# Patient Record
Sex: MALE | Race: WHITE | NOT HISPANIC OR LATINO | Employment: FULL TIME | URBAN - METROPOLITAN AREA
[De-identification: names, ages, dates, MRNs, and addresses within clinical notes are randomized per-mention and may not be internally consistent; named-entity substitution may affect disease eponyms.]

---

## 2023-05-06 LAB — HEPATITIS B VIRUS SURFACE AB (MIU/ML) IN SERUM: <3.1 MIU/ML

## 2023-05-09 LAB
NIL(NEG) CONTROL SPOT COUNT: NORMAL
PANEL A SPOT COUNT: 0
PANEL B SPOT COUNT: 0
POS CONTROL SPOT COUNT: NORMAL
T-SPOT. TB INTERPRETATION: NEGATIVE

## 2023-10-31 ENCOUNTER — TELEPHONE (OUTPATIENT)
Dept: PRIMARY CARE | Facility: CLINIC | Age: 58
End: 2023-10-31
Payer: COMMERCIAL

## 2023-10-31 DIAGNOSIS — E13.9 DIABETES MELLITUS OF OTHER TYPE WITHOUT COMPLICATION, UNSPECIFIED WHETHER LONG TERM INSULIN USE (MULTI): ICD-10-CM

## 2024-01-06 ENCOUNTER — LAB (OUTPATIENT)
Dept: LAB | Facility: LAB | Age: 59
End: 2024-01-06
Payer: COMMERCIAL

## 2024-01-06 DIAGNOSIS — E13.9 DIABETES MELLITUS OF OTHER TYPE WITHOUT COMPLICATION, UNSPECIFIED WHETHER LONG TERM INSULIN USE (MULTI): ICD-10-CM

## 2024-01-06 LAB
ALBUMIN SERPL-MCNC: 4.3 G/DL (ref 3.5–5)
ALP BLD-CCNC: 79 U/L (ref 35–125)
ALT SERPL-CCNC: 11 U/L (ref 5–40)
ANION GAP SERPL CALC-SCNC: 10 MMOL/L
AST SERPL-CCNC: 23 U/L (ref 5–40)
BILIRUB SERPL-MCNC: 0.6 MG/DL (ref 0.1–1.2)
BUN SERPL-MCNC: 15 MG/DL (ref 8–25)
CALCIUM SERPL-MCNC: 9.2 MG/DL (ref 8.5–10.4)
CHLORIDE SERPL-SCNC: 102 MMOL/L (ref 97–107)
CO2 SERPL-SCNC: 28 MMOL/L (ref 24–31)
CREAT SERPL-MCNC: 0.8 MG/DL (ref 0.4–1.6)
CREAT UR-MCNC: 59.2 MG/DL
EST. AVERAGE GLUCOSE BLD GHB EST-MCNC: 143 MG/DL
GFR SERPL CREATININE-BSD FRML MDRD: >90 ML/MIN/1.73M*2
GLUCOSE SERPL-MCNC: 125 MG/DL (ref 65–99)
HBA1C MFR BLD: 6.6 %
MICROALBUMIN UR-MCNC: <12 MG/L (ref 0–23)
MICROALBUMIN/CREAT UR: NORMAL MG/G{CREAT}
POTASSIUM SERPL-SCNC: 4.4 MMOL/L (ref 3.4–5.1)
PROT SERPL-MCNC: 7 G/DL (ref 5.9–7.9)
SODIUM SERPL-SCNC: 140 MMOL/L (ref 133–145)

## 2024-01-06 PROCEDURE — 82043 UR ALBUMIN QUANTITATIVE: CPT

## 2024-01-06 PROCEDURE — 83036 HEMOGLOBIN GLYCOSYLATED A1C: CPT

## 2024-01-06 PROCEDURE — 36415 COLL VENOUS BLD VENIPUNCTURE: CPT

## 2024-01-06 PROCEDURE — 80053 COMPREHEN METABOLIC PANEL: CPT

## 2024-01-06 PROCEDURE — 82570 ASSAY OF URINE CREATININE: CPT

## 2024-01-15 ENCOUNTER — TELEPHONE (OUTPATIENT)
Dept: PRIMARY CARE | Facility: CLINIC | Age: 59
End: 2024-01-15

## 2024-01-15 ENCOUNTER — OFFICE VISIT (OUTPATIENT)
Dept: PRIMARY CARE | Facility: CLINIC | Age: 59
End: 2024-01-15
Payer: COMMERCIAL

## 2024-01-15 VITALS
DIASTOLIC BLOOD PRESSURE: 72 MMHG | BODY MASS INDEX: 28.45 KG/M2 | WEIGHT: 204 LBS | OXYGEN SATURATION: 99 % | HEART RATE: 62 BPM | SYSTOLIC BLOOD PRESSURE: 126 MMHG

## 2024-01-15 DIAGNOSIS — E11.9 TYPE 2 DIABETES MELLITUS WITHOUT COMPLICATION, WITHOUT LONG-TERM CURRENT USE OF INSULIN (MULTI): ICD-10-CM

## 2024-01-15 DIAGNOSIS — Z23 NEED FOR SHINGLES VACCINE: ICD-10-CM

## 2024-01-15 DIAGNOSIS — B35.1 TOENAIL FUNGUS: ICD-10-CM

## 2024-01-15 DIAGNOSIS — M17.12 PRIMARY OSTEOARTHRITIS OF LEFT KNEE: ICD-10-CM

## 2024-01-15 DIAGNOSIS — M23.92 INTERNAL DERANGEMENT OF LEFT KNEE: Primary | ICD-10-CM

## 2024-01-15 PROCEDURE — 90471 IMMUNIZATION ADMIN: CPT | Performed by: NURSE PRACTITIONER

## 2024-01-15 PROCEDURE — 1036F TOBACCO NON-USER: CPT | Performed by: NURSE PRACTITIONER

## 2024-01-15 PROCEDURE — 3044F HG A1C LEVEL LT 7.0%: CPT | Performed by: NURSE PRACTITIONER

## 2024-01-15 PROCEDURE — 90750 HZV VACC RECOMBINANT IM: CPT | Performed by: NURSE PRACTITIONER

## 2024-01-15 PROCEDURE — 99214 OFFICE O/P EST MOD 30 MIN: CPT | Performed by: NURSE PRACTITIONER

## 2024-01-15 PROCEDURE — 3062F POS MACROALBUMINURIA REV: CPT | Performed by: NURSE PRACTITIONER

## 2024-01-15 PROCEDURE — 3078F DIAST BP <80 MM HG: CPT | Performed by: NURSE PRACTITIONER

## 2024-01-15 PROCEDURE — 4010F ACE/ARB THERAPY RXD/TAKEN: CPT | Performed by: NURSE PRACTITIONER

## 2024-01-15 PROCEDURE — 3074F SYST BP LT 130 MM HG: CPT | Performed by: NURSE PRACTITIONER

## 2024-01-15 RX ORDER — METFORMIN HYDROCHLORIDE 850 MG/1
850 TABLET ORAL
COMMUNITY
Start: 2022-09-27

## 2024-01-15 RX ORDER — LISINOPRIL 20 MG/1
20 TABLET ORAL DAILY
COMMUNITY
Start: 2023-02-22

## 2024-01-15 RX ORDER — OMEPRAZOLE 20 MG/1
20 CAPSULE, DELAYED RELEASE ORAL
COMMUNITY

## 2024-01-15 RX ORDER — AMLODIPINE BESYLATE 10 MG/1
10 TABLET ORAL DAILY
COMMUNITY
Start: 2023-09-14

## 2024-01-15 RX ORDER — GLIPIZIDE 10 MG/1
10 TABLET ORAL 2 TIMES DAILY
COMMUNITY
Start: 2014-04-14

## 2024-01-15 RX ORDER — ASPIRIN 81 MG/1
81 TABLET ORAL DAILY
COMMUNITY
Start: 2014-04-14

## 2024-01-15 RX ORDER — SIMVASTATIN 40 MG/1
80 TABLET, FILM COATED ORAL NIGHTLY
COMMUNITY
Start: 2022-10-04

## 2024-01-15 RX ORDER — ATENOLOL 50 MG/1
50 TABLET ORAL DAILY
COMMUNITY
Start: 2014-04-14

## 2024-01-15 NOTE — PATIENT INSTRUCTIONS
Thank you for choosing our office for your healthcare needs    Please continue your current medications  Work on healthy diet including [employer] and limiting sugar/bread/carbs.    Obtain MRI of your left knee due to ongoing left knee pain  You are referred to orthopedics for evaluation of your knee.    Topical medication for toenail fungus was provided.  Please use as directed    Shingles vaccine #1 provided today in office  Plan to obtain the second shot of the series in 2 to 6 months.

## 2024-01-15 NOTE — PROGRESS NOTES
Subjective   Patient ID: Robbie Gomez is a 58 y.o. male who presents for Diabetes.    HPI   Pt is a 57 yo M who presents for interval visit  Diet is varied  Active - walks    Tolerating meds  Check glucose very sparinging  Eye exam 11/23    Hx of knee pain - acute on chronic. Was doing jumping jacks summer 2023 and felt worse pain. Worse with kneeling. Pain impacts sleep, impacts day to day function. Knee feels unstable at times  Xray 7/23 shows OA  Pt does home exercise/stretching  Has used heat, topicals  No otc used   Hx of knee surgery in his 20s      Review of Systems  Constitutional Symptoms: Negative for fever, loss of appetite, headaches, fatigue.   Cardiovascular: Negative for chest pain/pressure, palpitations, edema  Respiratory: Negative for shortness of breath, dyspnea on exertion, pain with breathing, coughing.   Gastrointestinal: Negative for nausea, vomiting, abdominal pain, change in bowel habits  Musculoskeletal: Negative for  joint swelling, myalgias, cramps.   Integumentary: Negative for skin trouble or rash. Positive for toenail fungus  Neurological: Negative for headache, numbness, tingling, weakness, tremors. Positive for knee pain-see HPI  Psychiatric: Negative for depression, anxiety.   Endocrine: Negative for weight gain, heat or cold intolerance, polyuria, polydipsia, polyphagia.   Hematologic/Lymphatic: Negative for bruising, abnormal bleeding, swollen glands    Objective   /72   Pulse 62   Wt 92.5 kg (204 lb)   SpO2 99%   BMI 28.45 kg/m²     Physical Exam  General Appearance: Vital Signs have been reviewed. ROBBIE is comfortable. He is well nourished, and well developed. He is awake, alert, and oriented and appears his stated age. The patient is cooperative with exam.   Neck: Neck reveals supple, no adenopathy, no thyromegaly, or carotid bruits.   Chest: Lungs are clear to auscultation bilaterally with no wheezes, rales, or rhonchi.   Cardiovascular: RRR without MRG.  Bilateral DP pulses are 2+. Extremities reveal no cyanosis, clubbing, or edema.   Abdomen: Abdomen is soft, NT, ND with no masses.   Musculoskeletal: 5/5 and equal strength in bilateral upper and lower extremities. Left knee without deformity.  Full range of motion.  There is crepitus.  Tenderness over medial joint line.  Negative Apley's.  Negative varus/valgus instability.  MSP intact to left foot.  3+ pedal pulse noted  Skin: Skin reveals good turgor and no rashes . foot exam Reveals mild athlete's foot between toes.  Toenail fungus to left great toe  Neurological: Neuro: Intact and non-focal. Cranial nerves II - XII are grossly intact. Motor exams reveals 5/5 strength. Sensation is normal to touch, position and vibration. DTR: normal bilaterally.   Psychiatric: Patient has appropriate judgement. Patient has good insight. Patient's mood is appropriate       Assessment/Plan   Diagnoses and all orders for this visit:  Internal derangement of left knee  -     MR knee left wo IV contrast; Future  -     Referral to Orthopaedic Surgery; Future  Needs further evaluation  MRI ordered to rule out internal derangement.  Concern for meniscus tear  Joint line tenderness and crepitus which were not present on previous exam with primary  Ice, heat, knee brace/sleeve  Topicals and anti-inflammatories  Referred to orthopedics for further evaluation  Follow-up after MRI  Primary osteoarthritis of left knee  -     MR knee left wo IV contrast; Future  -     Referral to Orthopaedic Surgery; Future  MRI ordered  Concern for meniscus tear.  Joint line tenderness and Crepitus on range of motion which was not present at last exam with pain was primary care provider on chart review  Type 2 diabetes mellitus without complication, without long-term current use of insulin (CMS/[facility])  Continue current medications  Healthy diet  ADA diet, carb counting  Exercise  Weight loss  Recheck in 6 Months  Toenail fungus  -     efinaconazole 10 %  solution with applicator; Apply 1 Application topically once daily.  Medication as prescribed  Need for shingles vaccine  Indications, benefits and risks/SE discussed  Other orders  -     Follow Up In Primary Care - Health Maintenance; Future  -     Zoster vaccine, recombinant, adult (SHINGRIX)

## 2024-01-17 ENCOUNTER — DOCUMENTATION (OUTPATIENT)
Dept: PRIMARY CARE | Facility: CLINIC | Age: 59
End: 2024-01-17
Payer: COMMERCIAL

## 2024-01-18 ENCOUNTER — TELEPHONE (OUTPATIENT)
Dept: PRIMARY CARE | Facility: CLINIC | Age: 59
End: 2024-01-18
Payer: COMMERCIAL

## 2024-01-30 ENCOUNTER — HOSPITAL ENCOUNTER (OUTPATIENT)
Dept: RADIOLOGY | Facility: HOSPITAL | Age: 59
Discharge: HOME | End: 2024-01-30
Payer: COMMERCIAL

## 2024-01-30 DIAGNOSIS — M23.92 INTERNAL DERANGEMENT OF LEFT KNEE: ICD-10-CM

## 2024-01-30 DIAGNOSIS — M17.12 PRIMARY OSTEOARTHRITIS OF LEFT KNEE: ICD-10-CM

## 2024-01-30 PROCEDURE — 73721 MRI JNT OF LWR EXTRE W/O DYE: CPT | Mod: LT

## 2024-02-12 ENCOUNTER — OFFICE VISIT (OUTPATIENT)
Dept: ORTHOPEDIC SURGERY | Facility: CLINIC | Age: 59
End: 2024-02-12
Payer: COMMERCIAL

## 2024-02-12 DIAGNOSIS — M17.12 PRIMARY OSTEOARTHRITIS OF LEFT KNEE: Primary | ICD-10-CM

## 2024-02-12 PROCEDURE — 1036F TOBACCO NON-USER: CPT | Performed by: STUDENT IN AN ORGANIZED HEALTH CARE EDUCATION/TRAINING PROGRAM

## 2024-02-12 PROCEDURE — 99203 OFFICE O/P NEW LOW 30 MIN: CPT | Performed by: STUDENT IN AN ORGANIZED HEALTH CARE EDUCATION/TRAINING PROGRAM

## 2024-02-12 NOTE — PROGRESS NOTES
Robbie Gomez  is a 58 y.o. year-old  male. he  is a new patient to our office and presents with a chief complaint of Left  knee pain.  He notes about 6 months of knee pain.  He was doing a lot of workouts and some junk jumping jacks and seems to stem all from that no acute injury.  He has not had any formal treatment.  He has a history of a previous arthroscopy many years ago      Past Medical, Family, and Social History reviewed   Review of Systems  A complete review of systems was conducted, pertinent only to the HPI noted above.  Constitutional: None  Eyes: No additions to above history  Ears, Nose, Throat: No additions to above history  Cardiovascular: No additions to above history  Respiratory: No additions to above history  GI: No additions to above history  : No additions to above history  Skin/Neuro: No additions to above history  Endocrine/Heme/Lymph: No additions to above history  Immunologic: No additions to above history  Psychiatric: No additions to above history  Musculoskeletal: see above    GEN: Alert and Oriented x 3  Constitutional: Well appearing , in no apparent distress.  Skin: No rashes, erythema, or induration around knee    MUSCULOSKELETAL EXAM:     Left KNEE:  ROM: 0/0/130  Effusion: positive trace  Alignment: [varus]      Gait: [normal]  Sensation intact bilaterally sural/saphenous/sp/dp/tibial nerve bilaterally  Motor 5/5 knee flexion/extension/foot DF/PF/EHL/FHL bilaterally  Palpable/symmetric DP and PT pulse bilaterally      PATELLAR/EXTENSOR MECHANISM:   KNEE:  Patellar Crepitus: n  Patellar Compression Pain:n  Patellar Apprehension: [no]  Extensor Mechanism: [intact]  Straight Leg Raise: good      LIGAMENTS:  ACL: Lachmans: [negative]  PCL: [stable]  Valgus at 0 degrees: [stable]  Valgus at 30 degrees: [stable]  Varus at 0 degrees: [stable]  Varus at 30 degrees: [stable]    MENISCUS EXAM:  Joint Line Tenderness:medial joint line tenderness  McMurrays: [negative]  Pain with Deep  Flexion: [No]    Xrays and MRI independently viewed and interpreted: On his standing x-rays he has complete loss of his medial joint space on his MRI there is a degenerative meniscus tear and severe medial chondral thinning with underlying osseous edema    Patient was prescribed a [medial  brace] for [knee osteoarthritis].The patient is ambulatory with or without aid; but, has weakness, instability and/or deformity of their [Left kneewhich requires stabilization from this orthosis to improve their function. Verbal and written instructions for the use, wear schedule, cleaning and application of this item were given.  Patient was instructed that should the brace result in increased pain, decreased sensation, increased swelling, or an overall worsening of their medical condition, to please contact our office immediately. Orthotic management and training was provided for skin care, modifications due to healing tissues, edema changes, interruption in skin integrity, and safety precautions with the orthosis.    The patient history, physical examination and imaging studies are consistent with knee arthritis. The treatment options including NSAIDs, activity modification, PT (including the importance of obtaining full motion), and weight loss were discussed at length today. I have also suggested a potential for IA injection with cortisone but he was not interested in an injection today.  We will get him fit for a medial  brace. I have discussed the potential need for arthroplasty in the future. The patient acknowledged the current plan.

## 2024-02-29 DIAGNOSIS — M17.9 OSTEOARTHROSIS OF KNEE: Primary | ICD-10-CM

## 2024-04-01 ENCOUNTER — CLINICAL SUPPORT (OUTPATIENT)
Dept: PRIMARY CARE | Facility: CLINIC | Age: 59
End: 2024-04-01
Payer: COMMERCIAL

## 2024-04-01 VITALS — TEMPERATURE: 97.5 F | SYSTOLIC BLOOD PRESSURE: 124 MMHG | DIASTOLIC BLOOD PRESSURE: 76 MMHG

## 2024-04-01 PROCEDURE — 90471 IMMUNIZATION ADMIN: CPT | Performed by: NURSE PRACTITIONER

## 2024-04-01 PROCEDURE — 90750 HZV VACC RECOMBINANT IM: CPT | Performed by: NURSE PRACTITIONER

## 2024-06-20 ENCOUNTER — PATIENT MESSAGE (OUTPATIENT)
Dept: PRIMARY CARE | Facility: CLINIC | Age: 59
End: 2024-06-20
Payer: COMMERCIAL

## 2024-06-21 NOTE — TELEPHONE ENCOUNTER
From: Robbie Gomez  To: Palma Vicente  Sent: 6/20/2024 9:15 PM EDT  Subject: Lisinopril    I need a prescription refill for Lisinopril sent to [facility]    Address  [facility]  [address] [place] [phone]  Fax [phone]

## 2024-07-26 ENCOUNTER — HOSPITAL ENCOUNTER (OUTPATIENT)
Dept: RADIOLOGY | Facility: CLINIC | Age: 59
Discharge: HOME | End: 2024-07-26
Payer: COMMERCIAL

## 2024-07-26 ENCOUNTER — OFFICE VISIT (OUTPATIENT)
Dept: PRIMARY CARE | Facility: CLINIC | Age: 59
End: 2024-07-26
Payer: COMMERCIAL

## 2024-07-26 VITALS
OXYGEN SATURATION: 98 % | HEART RATE: 73 BPM | DIASTOLIC BLOOD PRESSURE: 80 MMHG | SYSTOLIC BLOOD PRESSURE: 118 MMHG | WEIGHT: 201 LBS | BODY MASS INDEX: 28.03 KG/M2 | TEMPERATURE: 97.8 F

## 2024-07-26 DIAGNOSIS — M25.551 RIGHT HIP PAIN: Primary | ICD-10-CM

## 2024-07-26 DIAGNOSIS — M25.561 ACUTE PAIN OF RIGHT KNEE: ICD-10-CM

## 2024-07-26 DIAGNOSIS — M25.551 RIGHT HIP PAIN: ICD-10-CM

## 2024-07-26 PROBLEM — N20.1 CALCULUS OF URETER: Status: RESOLVED | Noted: 2024-07-26 | Resolved: 2024-07-26

## 2024-07-26 PROBLEM — N52.9 ERECTILE DYSFUNCTION: Status: ACTIVE | Noted: 2020-02-01

## 2024-07-26 PROBLEM — E66.9 OBESITY WITH BODY MASS INDEX 30 OR GREATER: Status: ACTIVE | Noted: 2021-02-27

## 2024-07-26 PROBLEM — R42 VERTIGO: Status: ACTIVE | Noted: 2018-09-18

## 2024-07-26 PROBLEM — M23.92 DERANGEMENT OF LEFT KNEE: Status: RESOLVED | Noted: 2024-07-26 | Resolved: 2024-07-26

## 2024-07-26 PROBLEM — N20.0 CALCULUS OF KIDNEY: Status: RESOLVED | Noted: 2024-07-26 | Resolved: 2024-07-26

## 2024-07-26 PROBLEM — N32.81 OVERACTIVE BLADDER: Status: ACTIVE | Noted: 2024-07-26

## 2024-07-26 PROBLEM — I10 HYPERTENSION: Status: ACTIVE | Noted: 2018-07-11

## 2024-07-26 PROBLEM — E11.9 DIABETES MELLITUS (MULTI): Status: ACTIVE | Noted: 2020-06-27

## 2024-07-26 PROBLEM — R35.1 NOCTURIA: Status: ACTIVE | Noted: 2019-08-17

## 2024-07-26 PROBLEM — E29.1 TESTICULAR HYPOFUNCTION: Status: ACTIVE | Noted: 2024-07-26

## 2024-07-26 PROBLEM — B35.1 ONYCHOMYCOSIS OF TOENAIL: Status: ACTIVE | Noted: 2024-07-26

## 2024-07-26 PROBLEM — E78.00 HYPERCHOLESTEREMIA: Status: ACTIVE | Noted: 2018-10-27

## 2024-07-26 PROBLEM — K85.90 ACUTE PANCREATITIS (HHS-HCC): Status: RESOLVED | Noted: 2021-07-08 | Resolved: 2024-07-26

## 2024-07-26 PROBLEM — M17.12 OSTEOARTHRITIS OF LEFT KNEE: Status: ACTIVE | Noted: 2024-07-26

## 2024-07-26 PROBLEM — M72.2 PLANTAR FASCIITIS, LEFT: Status: RESOLVED | Noted: 2018-10-27 | Resolved: 2024-07-26

## 2024-07-26 PROCEDURE — 99213 OFFICE O/P EST LOW 20 MIN: CPT | Performed by: NURSE PRACTITIONER

## 2024-07-26 PROCEDURE — 73562 X-RAY EXAM OF KNEE 3: CPT | Mod: RT

## 2024-07-26 PROCEDURE — 73502 X-RAY EXAM HIP UNI 2-3 VIEWS: CPT | Mod: RT

## 2024-07-26 PROCEDURE — 3079F DIAST BP 80-89 MM HG: CPT | Performed by: NURSE PRACTITIONER

## 2024-07-26 PROCEDURE — 4010F ACE/ARB THERAPY RXD/TAKEN: CPT | Performed by: NURSE PRACTITIONER

## 2024-07-26 PROCEDURE — 3044F HG A1C LEVEL LT 7.0%: CPT | Performed by: NURSE PRACTITIONER

## 2024-07-26 PROCEDURE — 3062F POS MACROALBUMINURIA REV: CPT | Performed by: NURSE PRACTITIONER

## 2024-07-26 PROCEDURE — 3074F SYST BP LT 130 MM HG: CPT | Performed by: NURSE PRACTITIONER

## 2024-07-26 RX ORDER — CICLOPIROX 80 MG/ML
SOLUTION TOPICAL
COMMUNITY
Start: 2023-09-14 | End: 2024-07-26 | Stop reason: ALTCHOICE

## 2024-07-26 RX ORDER — DIAZEPAM 5 MG/1
TABLET ORAL
COMMUNITY
Start: 2024-02-20 | End: 2024-07-26 | Stop reason: ALTCHOICE

## 2024-07-26 RX ORDER — MIRABEGRON 50 MG/1
TABLET, EXTENDED RELEASE ORAL EVERY 24 HOURS
COMMUNITY
Start: 2020-09-29 | End: 2024-07-26 | Stop reason: ALTCHOICE

## 2024-07-26 RX ORDER — FESOTERODINE FUMARATE 8 MG/1
TABLET, FILM COATED, EXTENDED RELEASE ORAL EVERY 24 HOURS
COMMUNITY
Start: 2020-09-01 | End: 2024-07-26 | Stop reason: ALTCHOICE

## 2024-07-26 RX ORDER — NAPROXEN 500 MG/1
500 TABLET ORAL
Qty: 20 TABLET | Refills: 0 | Status: SHIPPED | OUTPATIENT
Start: 2024-07-26 | End: 2024-08-05

## 2024-07-26 RX ORDER — SILDENAFIL 100 MG/1
TABLET, FILM COATED ORAL EVERY 24 HOURS
COMMUNITY
Start: 2020-09-01 | End: 2024-07-26 | Stop reason: ALTCHOICE

## 2024-07-26 ASSESSMENT — ENCOUNTER SYMPTOMS
NUMBNESS: 0
BACK PAIN: 1
ARTHRALGIAS: 1

## 2024-07-26 ASSESSMENT — PATIENT HEALTH QUESTIONNAIRE - PHQ9
1. LITTLE INTEREST OR PLEASURE IN DOING THINGS: NOT AT ALL
2. FEELING DOWN, DEPRESSED OR HOPELESS: NOT AT ALL
SUM OF ALL RESPONSES TO PHQ9 QUESTIONS 1 AND 2: 0

## 2024-07-26 ASSESSMENT — PAIN SCALES - GENERAL: PAINLEVEL: 3

## 2024-07-26 NOTE — PROGRESS NOTES
Subjective   Patient ID: Robbie Gomez is a 59 y.o. male who presents for Leg Pain (Right/X 1 day/No known injury ).    HPI   Pt complains of right leg pain that began yesterday while walking  Pain with pressure and walking  Pain in hip with pressure    No trauma  Is active    No otc meds or interventions    Review of Systems   Constitutional:  Negative for chills and fever.   Respiratory: Negative.  Negative for cough.    Cardiovascular: Negative.    Gastrointestinal: Negative.    Genitourinary: Negative.    Musculoskeletal:  Positive for arthralgias, back pain and gait problem.   Skin:  Negative for rash.   Neurological:  Negative for numbness.       Objective   /80 (BP Location: Left arm, Patient Position: Sitting)   Pulse 73   Temp 36.6 °C (97.8 °F) (Temporal)   Wt 91.2 kg (201 lb)   SpO2 98%   BMI 28.03 kg/m²     Physical Exam  A/O x3 NAD but appears uncomfortable on exam table  Neck supple.   Lungs CTA bilaterally, breathing unlabored  Heart with RRR  Abd soft NT/ND, no masses. No cvat  No bony spinal tenderness/stepoff/deformity.   Pelvis stable  Tenderness over right greater trochanter. FROM hip with pain on adduction.   Crepitus to right knee with FROM. No instability. Mild joint line tenderness noted  Skin warm and dry, no rash  Neuro grossly intact. 3+ DP bilaterally. Toes warm and mobile    Assessment/Plan   Diagnoses and all orders for this visit:  Right hip pain  -     XR hip right with pelvis when performed 2 or 3 views; Future  -     naproxen (Naprosyn) 500 mg tablet; Take 1 tablet (500 mg) by mouth 2 times daily (morning and late afternoon) for 10 days.  Acute pain of right knee  -     naproxen (Naprosyn) 500 mg tablet; Take 1 tablet (500 mg) by mouth 2 times daily (morning and late afternoon) for 10 days.     Acute pain  - needs better control  Xray hip and knee ordered - await results  Medication as prescribed  Ice, rest, stretching  Consider PT if not improving  Follow up 2 weeks  INB, sooner if worse

## 2024-07-29 DIAGNOSIS — E78.5 DYSLIPIDEMIA: ICD-10-CM

## 2024-07-29 DIAGNOSIS — Z00.00 WELL ADULT EXAM: Primary | ICD-10-CM

## 2024-07-29 DIAGNOSIS — Z12.5 SCREENING FOR MALIGNANT NEOPLASM OF PROSTATE: ICD-10-CM

## 2024-07-29 DIAGNOSIS — E11.9 TYPE 2 DIABETES MELLITUS WITHOUT COMPLICATION, WITHOUT LONG-TERM CURRENT USE OF INSULIN (MULTI): ICD-10-CM

## 2024-07-29 DIAGNOSIS — I10 ESSENTIAL HYPERTENSION, BENIGN: ICD-10-CM

## 2024-08-03 ENCOUNTER — LAB (OUTPATIENT)
Dept: LAB | Facility: LAB | Age: 59
End: 2024-08-03
Payer: COMMERCIAL

## 2024-08-03 DIAGNOSIS — E78.5 DYSLIPIDEMIA: ICD-10-CM

## 2024-08-03 DIAGNOSIS — Z12.5 SCREENING FOR MALIGNANT NEOPLASM OF PROSTATE: ICD-10-CM

## 2024-08-03 DIAGNOSIS — E11.9 TYPE 2 DIABETES MELLITUS WITHOUT COMPLICATION, WITHOUT LONG-TERM CURRENT USE OF INSULIN (MULTI): ICD-10-CM

## 2024-08-03 DIAGNOSIS — I10 ESSENTIAL HYPERTENSION, BENIGN: ICD-10-CM

## 2024-08-03 LAB
ALBUMIN SERPL-MCNC: 4.3 G/DL (ref 3.5–5)
ALP BLD-CCNC: 91 U/L (ref 35–125)
ALT SERPL-CCNC: 11 U/L (ref 5–40)
ANION GAP SERPL CALC-SCNC: 10 MMOL/L
AST SERPL-CCNC: 20 U/L (ref 5–40)
BASOPHILS # BLD AUTO: 0.08 X10*3/UL (ref 0–0.1)
BASOPHILS NFR BLD AUTO: 1.1 %
BILIRUB SERPL-MCNC: 0.4 MG/DL (ref 0.1–1.2)
BUN SERPL-MCNC: 15 MG/DL (ref 8–25)
CALCIUM SERPL-MCNC: 9.2 MG/DL (ref 8.5–10.4)
CHLORIDE SERPL-SCNC: 104 MMOL/L (ref 97–107)
CHOLEST SERPL-MCNC: 153 MG/DL (ref 133–200)
CHOLEST/HDLC SERPL: 3.3 {RATIO}
CO2 SERPL-SCNC: 28 MMOL/L (ref 24–31)
CREAT SERPL-MCNC: 0.7 MG/DL (ref 0.4–1.6)
EGFRCR SERPLBLD CKD-EPI 2021: >90 ML/MIN/1.73M*2
EOSINOPHIL # BLD AUTO: 0.3 X10*3/UL (ref 0–0.7)
EOSINOPHIL NFR BLD AUTO: 4 %
ERYTHROCYTE [DISTWIDTH] IN BLOOD BY AUTOMATED COUNT: 12.2 % (ref 11.5–14.5)
EST. AVERAGE GLUCOSE BLD GHB EST-MCNC: 146 MG/DL
GLUCOSE SERPL-MCNC: 85 MG/DL (ref 65–99)
HBA1C MFR BLD: 6.7 %
HCT VFR BLD AUTO: 41.2 % (ref 41–52)
HDLC SERPL-MCNC: 47 MG/DL
HGB BLD-MCNC: 12.9 G/DL (ref 13.5–17.5)
IMM GRANULOCYTES # BLD AUTO: 0.02 X10*3/UL (ref 0–0.7)
IMM GRANULOCYTES NFR BLD AUTO: 0.3 % (ref 0–0.9)
LDLC SERPL CALC-MCNC: 96 MG/DL (ref 65–130)
LYMPHOCYTES # BLD AUTO: 1.39 X10*3/UL (ref 1.2–4.8)
LYMPHOCYTES NFR BLD AUTO: 18.3 %
MCH RBC QN AUTO: 28 PG (ref 26–34)
MCHC RBC AUTO-ENTMCNC: 31.3 G/DL (ref 32–36)
MCV RBC AUTO: 90 FL (ref 80–100)
MONOCYTES # BLD AUTO: 0.75 X10*3/UL (ref 0.1–1)
MONOCYTES NFR BLD AUTO: 9.9 %
NEUTROPHILS # BLD AUTO: 5.04 X10*3/UL (ref 1.2–7.7)
NEUTROPHILS NFR BLD AUTO: 66.4 %
NRBC BLD-RTO: 0 /100 WBCS (ref 0–0)
PLATELET # BLD AUTO: 225 X10*3/UL (ref 150–450)
POTASSIUM SERPL-SCNC: 5 MMOL/L (ref 3.4–5.1)
PROT SERPL-MCNC: 7.7 G/DL (ref 5.9–7.9)
PSA SERPL-MCNC: 0.4 NG/ML
RBC # BLD AUTO: 4.6 X10*6/UL (ref 4.5–5.9)
SODIUM SERPL-SCNC: 142 MMOL/L (ref 133–145)
TRIGL SERPL-MCNC: 50 MG/DL (ref 40–150)
WBC # BLD AUTO: 7.6 X10*3/UL (ref 4.4–11.3)

## 2024-08-03 PROCEDURE — 84153 ASSAY OF PSA TOTAL: CPT

## 2024-08-03 PROCEDURE — 36415 COLL VENOUS BLD VENIPUNCTURE: CPT

## 2024-08-03 PROCEDURE — 83036 HEMOGLOBIN GLYCOSYLATED A1C: CPT

## 2024-08-03 PROCEDURE — 80061 LIPID PANEL: CPT

## 2024-08-03 PROCEDURE — 85025 COMPLETE CBC W/AUTO DIFF WBC: CPT

## 2024-08-03 PROCEDURE — 80053 COMPREHEN METABOLIC PANEL: CPT

## 2024-08-03 ASSESSMENT — ENCOUNTER SYMPTOMS
RESPIRATORY NEGATIVE: 1
FEVER: 0
GASTROINTESTINAL NEGATIVE: 1
COUGH: 0
CHILLS: 0
CARDIOVASCULAR NEGATIVE: 1

## 2024-08-05 ENCOUNTER — OFFICE VISIT (OUTPATIENT)
Dept: PRIMARY CARE | Facility: CLINIC | Age: 59
End: 2024-08-05
Payer: COMMERCIAL

## 2024-08-05 ENCOUNTER — TELEPHONE (OUTPATIENT)
Dept: PRIMARY CARE | Facility: CLINIC | Age: 59
End: 2024-08-05

## 2024-08-05 ENCOUNTER — APPOINTMENT (OUTPATIENT)
Dept: PRIMARY CARE | Facility: CLINIC | Age: 59
End: 2024-08-05
Payer: COMMERCIAL

## 2024-08-05 VITALS
WEIGHT: 203 LBS | SYSTOLIC BLOOD PRESSURE: 138 MMHG | HEART RATE: 72 BPM | HEIGHT: 70 IN | DIASTOLIC BLOOD PRESSURE: 80 MMHG | OXYGEN SATURATION: 99 % | TEMPERATURE: 98.2 F | BODY MASS INDEX: 29.06 KG/M2

## 2024-08-05 DIAGNOSIS — E11.9 TYPE 2 DIABETES MELLITUS WITHOUT COMPLICATION, WITHOUT LONG-TERM CURRENT USE OF INSULIN (MULTI): ICD-10-CM

## 2024-08-05 DIAGNOSIS — Z00.00 WELL ADULT EXAM: Primary | ICD-10-CM

## 2024-08-05 DIAGNOSIS — I10 ESSENTIAL HYPERTENSION, BENIGN: ICD-10-CM

## 2024-08-05 DIAGNOSIS — E78.00 HYPERCHOLESTEREMIA: ICD-10-CM

## 2024-08-05 PROCEDURE — 99212 OFFICE O/P EST SF 10 MIN: CPT | Performed by: NURSE PRACTITIONER

## 2024-08-05 PROCEDURE — 3062F POS MACROALBUMINURIA REV: CPT | Performed by: NURSE PRACTITIONER

## 2024-08-05 PROCEDURE — 3048F LDL-C <100 MG/DL: CPT | Performed by: NURSE PRACTITIONER

## 2024-08-05 PROCEDURE — 1036F TOBACCO NON-USER: CPT | Performed by: NURSE PRACTITIONER

## 2024-08-05 PROCEDURE — 3044F HG A1C LEVEL LT 7.0%: CPT | Performed by: NURSE PRACTITIONER

## 2024-08-05 PROCEDURE — 3008F BODY MASS INDEX DOCD: CPT | Performed by: NURSE PRACTITIONER

## 2024-08-05 PROCEDURE — 3079F DIAST BP 80-89 MM HG: CPT | Performed by: NURSE PRACTITIONER

## 2024-08-05 PROCEDURE — 99396 PREV VISIT EST AGE 40-64: CPT | Performed by: NURSE PRACTITIONER

## 2024-08-05 PROCEDURE — 3075F SYST BP GE 130 - 139MM HG: CPT | Performed by: NURSE PRACTITIONER

## 2024-08-05 PROCEDURE — 4010F ACE/ARB THERAPY RXD/TAKEN: CPT | Performed by: NURSE PRACTITIONER

## 2024-08-05 ASSESSMENT — PAIN SCALES - GENERAL: PAINLEVEL: 0-NO PAIN

## 2024-08-05 NOTE — PROGRESS NOTES
Subjective   Patient ID: Robbie Gomez is a 59 y.o. male who presents for Annual Exam.    HPI   ROBBIE is seen for for his comprehensive physical exam.   PMH, PSH, family history and social history were reviewed and updated.    Diet is not the best, is a very picky eater  Tries to stay away from carbs, likes fish  Exercise - has difficulty with left knee OA. Active at work  non smoker.     colonoscopy 2015 NL 10 yr return  h/o pancreatitis seen by Dr Preciado on omeprazole 20 mg PRN    Immunizations reviewed      ROBBIE is seen for follow-up of Diabetes 2, non insulin requiring.  A1C 6.7% Metformin 850 mg BID, Glipizide 10 mg BID He is tolerating his medications. He does not complain of the following diabetic symptoms: blurred vision, polydipsia , polyphagia and polyuria . ROBBIE does not have diabetic complications. ROBBIE is compliant with his medications. He is reasonably compliant with a diabetic diet. Periodic fasting glucose check < 150  11/23 Exam revealed no retinopathy.     ROBBIE is here also for follow up of hypertension. Stable on medication. Used to see Dr. Garvin q6mo- stopped when COVID hit. Has had heart cath in past- no stent placed. He does not have CHF, claudication and edema as complications related to his hypertension.     ROBBIE is seen today also for follow up of Hypercholesterolemia.  Simvastatin 80 mg daily ROBBIE is tolerating cholesterol medication and complains of no side effects.       Review of Systems  Constitutional Symptoms: negative for fever, loss of appetite, headaches, fatigue.   Eyes: negative for loss and blurring of vision, double vision.   Ear, Nose, Mouth, Throat: negative for hearing loss, tinnitus, nasal congestion, rhinorrhea, nose bleeds, teeth problems, mouth sores, gum disease, dysphagia, sore throat.   Cardiovascular: negative for chest pain/pressure, palpitations, edema, claudication.   Respiratory: negative for shortness of breath, dyspnea on exertion, pain with  "breathing, coughing.   Breast: negative for tenderness, masses, gynecomastia.   Gastrointestinal: negative for anorexia, indigestion, nausea, vomiting, abdominal pain, change in bowel habits, diarrhea, constipation, hematochezia, melena, blood in stool.    : Negative for urinary or genital complaints  Musculoskeletal: negative for joint pain, joint swelling, myalgia, cramps. Positive for left knee pain (chronic), right hip pain (resolving)  Integumentary: negative for change in mole, skin trouble or rash.   Neurological: negative for headache, numbness, tingling, weakness, tremors.   Psychiatric: negative for depression, anxiety.   Endocrine: negative for weight gain, heat or cold intolerance, polyuria, polydipsia, polyphagia.   Hematologic/Lymphatic: negative for bruising, abnormal bleeding, swollen glands     Objective   /80 (BP Location: Left arm, Patient Position: Sitting)   Pulse 72   Temp 36.8 °C (98.2 °F) (Temporal)   Ht 1.778 m (5' 10\")   Wt 92.1 kg (203 lb)   SpO2 99%   BMI 29.13 kg/m²     Physical Exam  General Appearance: Vital Signs have been reviewed. Comfortable. Well nourished, and well developed. He is awake, alert, and oriented and appears his stated age. The patient is cooperative with exam.  Head: Hair pattern reveals a normal pattern for patient's age and The face shows no abnormalities.  Eyes: PERRLA, EOMI, conjunctiva and sclera clear. Extraocular muscle exam reveals EOMI.  Ears, Nose, Mouth, and Throat: EARS: External bilateral ears reveals normal helix, tragus and ear lobe. Bilateral canals are normal. Both tympanic membranes are pearly gray and landmarks normal.   NOSE: Nasal mucosa in both nostrils reveals no polyps, ulcerations, or lesions. Teeth reveal good repair. Posterior pharynx reveals no abnormalities.  Neck: Neck reveals supple, no adenopathy, no thyromegaly, or carotid bruits.  Chest: Lungs are clear to auscultation bilaterally with no wheezes, rales, or " rhonchi.  Cardiovascular: RRR without MRG. Bilateral DP pulses are 2+. Extremities reveal no cyanosis, clubbing, or edema.  Abdomen: Abdomen with normoactive bowel sounds x4 quads, Abd is soft, NT, ND with no masses. .  Genitourinary: deferred  Musculoskeletal: 5/5 and equal strength in bilateral upper and lower extremities.  Skin: Skin reveals good turgor and no rashes.  Neurological:  Intact and non-focal.  Psychiatric: Patient has appropriate judgement. Patient has good insight. Patient's mood is appropriate.      Assessment/Plan   Diagnoses and all orders for this visit:  Well adult exam  60 yo M well exam  Preventative screenings reviewed   Immunizations reviewed - defers prevnar 20  Mediterranean diet, exercise, safety  Follow up 6 months  Type 2 diabetes mellitus without complication, without long-term current use of insulin ([facility])  Glucose 85 with A1c 6.7  Continue metformin 850 twice daily and glipizide 10 mg twice daily  Encouraged to consider change of medication-discontinue glipizide for something like SGLT2i or GLP-1. Pt defers change  Continue to work on diabetic diet, exercise  Follow-up 6 months  Hypercholesteremia  The 10-year ASCVD risk score (Delano SAENZ, et al., 2019) is: 17.3%    Values used to calculate the score:      Age: 59 years      Sex: Male      Is Non- : No      Diabetic: Yes      Tobacco smoker: No      Systolic Blood Pressure: 138 mmHg      Is BP treated: Yes      HDL Cholesterol: 47 mg/dL      Total Cholesterol: 153 mg/dL  Cardiovascular risks discussed and, if needed, lifestyle modifications recommended, including nutritional choices, exercise, and elimination of habits contributing to risk.  We agreed on a plan to reduce the current cardiovascular risk.  Aspirin use/tissues was discussed after reviewing updated guidelines  Continue Zocor 80 mg  Mediterranean diet, soluble fiber  Follow-up 6 months  Essential hypertension, benign  Continue Amlodipine 10 mg  daily, atenolol 50 mg twice daily, lisinopril 20 mg daily  Mediterranean diet/ROBYN diet  Exercise  Monitor bp and record  Follow up 6-month  Other orders  -     Follow Up In Primary Care - Health Maintenance  -     Follow Up In Primary Care - Established; Future

## 2024-08-09 ENCOUNTER — LAB (OUTPATIENT)
Dept: LAB | Facility: LAB | Age: 59
End: 2024-08-09
Payer: COMMERCIAL

## 2024-08-09 LAB — COTININE UR QL SCN: NEGATIVE

## 2024-12-12 ENCOUNTER — APPOINTMENT (OUTPATIENT)
Dept: OPHTHALMOLOGY | Facility: CLINIC | Age: 59
End: 2024-12-12
Payer: COMMERCIAL

## 2024-12-23 ENCOUNTER — APPOINTMENT (OUTPATIENT)
Dept: OPHTHALMOLOGY | Facility: CLINIC | Age: 59
End: 2024-12-23
Payer: COMMERCIAL

## 2024-12-30 ENCOUNTER — OFFICE VISIT (OUTPATIENT)
Dept: URGENT CARE | Age: 59
End: 2024-12-30
Payer: COMMERCIAL

## 2024-12-30 VITALS
HEART RATE: 72 BPM | DIASTOLIC BLOOD PRESSURE: 74 MMHG | RESPIRATION RATE: 22 BRPM | BODY MASS INDEX: 29.13 KG/M2 | OXYGEN SATURATION: 98 % | WEIGHT: 203 LBS | SYSTOLIC BLOOD PRESSURE: 183 MMHG | TEMPERATURE: 98.1 F

## 2024-12-30 DIAGNOSIS — M25.561 ACUTE PAIN OF RIGHT KNEE: Primary | ICD-10-CM

## 2024-12-30 PROCEDURE — 3078F DIAST BP <80 MM HG: CPT | Performed by: REGISTERED NURSE

## 2024-12-30 PROCEDURE — 3062F POS MACROALBUMINURIA REV: CPT | Performed by: REGISTERED NURSE

## 2024-12-30 PROCEDURE — 3044F HG A1C LEVEL LT 7.0%: CPT | Performed by: REGISTERED NURSE

## 2024-12-30 PROCEDURE — 99203 OFFICE O/P NEW LOW 30 MIN: CPT | Performed by: REGISTERED NURSE

## 2024-12-30 PROCEDURE — 3048F LDL-C <100 MG/DL: CPT | Performed by: REGISTERED NURSE

## 2024-12-30 PROCEDURE — 3077F SYST BP >= 140 MM HG: CPT | Performed by: REGISTERED NURSE

## 2024-12-30 PROCEDURE — 1036F TOBACCO NON-USER: CPT | Performed by: REGISTERED NURSE

## 2024-12-30 PROCEDURE — 4010F ACE/ARB THERAPY RXD/TAKEN: CPT | Performed by: REGISTERED NURSE

## 2024-12-30 RX ORDER — NAPROXEN 500 MG/1
500 TABLET ORAL
Qty: 20 TABLET | Refills: 0 | Status: SHIPPED | OUTPATIENT
Start: 2024-12-30 | End: 2025-12-30

## 2024-12-30 ASSESSMENT — ENCOUNTER SYMPTOMS
LOSS OF MOTION: 0
ARTHRALGIAS: 1
MUSCLE WEAKNESS: 1

## 2024-12-30 NOTE — PROGRESS NOTES
Subjective   Patient ID: Robbie Gomez is a 59 y.o. male. They present today with a chief complaint of Knee Pain (R knee pain x 2 days ago. Patient does not recall any injury but was pushing a tractor and feels like maybe this is when happened).    History of Present Illness    Knee Pain   The incident occurred 12 to 24 hours ago. The incident occurred at home. The injury mechanism is unknown. The pain is present in the right knee. The quality of the pain is described as aching and stabbing. The pain is at a severity of 5/10. The pain is moderate. The pain has been Intermittent since onset. Associated symptoms include muscle weakness. Pertinent negatives include no loss of motion. The symptoms are aggravated by movement.       Past Medical History  Allergies as of 12/30/2024    (No Known Allergies)       (Not in a hospital admission)       Past Medical History:   Diagnosis Date    Calculus of kidney 07/26/2024    Calculus of ureter 07/26/2024    Derangement of left knee 07/26/2024    Plantar fasciitis, left 10/27/2018       No past surgical history on file.     reports that he has never smoked. He has never used smokeless tobacco. He reports that he does not drink alcohol and does not use drugs.    Review of Systems  Review of Systems   Musculoskeletal:  Positive for arthralgias.        Right knee pain   All other systems reviewed and are negative.                                 Objective    Vitals:    12/30/24 1706   BP: (!) 183/74   BP Location: Left arm   Patient Position: Sitting   BP Cuff Size: Adult   Pulse: 72   Resp: 22   Temp: 36.7 °C (98.1 °F)   SpO2: 98%   Weight: 92.1 kg (203 lb)     No LMP for male patient.    Physical Exam  Musculoskeletal:      Right knee: No swelling, deformity, bony tenderness or crepitus. Decreased range of motion. Tenderness present.        Legs:          Procedures    Point of Care Test & Imaging Results from this visit  No results found for this visit on 12/30/24.   No  results found.    Diagnostic study results (if any) were reviewed by Crystal L Severino, APRN-CNP.    Assessment/Plan   Allergies, medications, history, and pertinent labs/EKGs/Imaging reviewed by Crystal L Severino, APRN-CNP.     Medical Decision Making  59-year-old male patient presents accompanied by his wife with complaints of pain to his inner right knee.  Patient states he does not recall any injury but he was pushing a tractor 2 days ago and shortly after finishing he began to experience some tenderness to this area.  He states he was not very active on Saturday or Sunday but today he did return to work and he began to have increased pain immediately with ambulation and movement.  Patient states he does not believe he caused any injury to the bone believes he may have torn or tweaked a muscle, states he does not believe he needs an x-ray.    Orders and Diagnoses  Diagnoses and all orders for this visit:  Acute pain of right knee  -     naproxen (Naprosyn) 500 mg tablet; Take 1 tablet (500 mg) by mouth 2 times daily (morning and late afternoon).  RICE  Tylenol as needed    Medical Admin Record      Patient disposition: Home    Electronically signed by Crystal L Severino, APRN-CNP  6:09 PM

## 2025-01-09 ENCOUNTER — OFFICE VISIT (OUTPATIENT)
Dept: ORTHOPEDIC SURGERY | Facility: CLINIC | Age: 60
End: 2025-01-09
Payer: COMMERCIAL

## 2025-01-09 DIAGNOSIS — S83.411A SPRAIN OF MEDIAL COLLATERAL LIGAMENT OF RIGHT KNEE, INITIAL ENCOUNTER: ICD-10-CM

## 2025-01-09 DIAGNOSIS — M17.0 PRIMARY OSTEOARTHRITIS OF BOTH KNEES: ICD-10-CM

## 2025-01-09 DIAGNOSIS — S83.241A ACUTE MEDIAL MENISCUS TEAR OF RIGHT KNEE, INITIAL ENCOUNTER: ICD-10-CM

## 2025-01-09 DIAGNOSIS — M25.561 RIGHT KNEE PAIN, UNSPECIFIED CHRONICITY: Primary | ICD-10-CM

## 2025-01-09 PROCEDURE — 99214 OFFICE O/P EST MOD 30 MIN: CPT | Performed by: STUDENT IN AN ORGANIZED HEALTH CARE EDUCATION/TRAINING PROGRAM

## 2025-01-09 PROCEDURE — 4010F ACE/ARB THERAPY RXD/TAKEN: CPT | Performed by: STUDENT IN AN ORGANIZED HEALTH CARE EDUCATION/TRAINING PROGRAM

## 2025-01-09 PROCEDURE — L1812 KO ELASTIC W/JOINTS PRE OTS: HCPCS | Performed by: STUDENT IN AN ORGANIZED HEALTH CARE EDUCATION/TRAINING PROGRAM

## 2025-01-09 RX ORDER — MELOXICAM 15 MG/1
15 TABLET ORAL DAILY
Qty: 30 TABLET | Refills: 0 | Status: SHIPPED | OUTPATIENT
Start: 2025-01-09 | End: 2025-02-08

## 2025-01-09 NOTE — PROGRESS NOTES
Sports Medicine Office Note    Today's Date:  01/09/2025     HPI: Robbie Gomez is a 59 y.o. male with history of left knee DJD, NIDDM (last hemoglobin A1c 6.7 on 8/3/2024) who presents today for evaluation of acute on chronic right knee pain.  He states that he has had some pain in his right knee for roughly 6 months, then 11 days ago was helping push a tractor, then 2 days after developed worsening of right knee pain.  States he may have mild swelling of his right knee, but denies any redness, warmth, bruising, radiation of pain, numbness, tingling, weakness.  Denies any mechanical locking/catching.  States he feels occasionally unstable on his right knee, but denies any event where he felt his right knee give out.  He was seen at urgent care and was prescribed short course of naproxen, advised to take Tylenol as needed in addition to rest, ice, compression, and elevation.  Of note, at that time patient declined radiographs.  He has been using OTC knee brace which provides minimal relief.  States his pain has improved since initial onset, currently stating pain is roughly 2/10.  However, he states pain is worse at night, particularly when there is any pressure over medial aspect of his right knee.  States pain is primarily in medial aspect which is where he has had chronic pain.  States he just ran out of naproxen which he feels was giving him some relief.    He has no other complaints.    Physical Examination:     The RIGHT knee is without obvious signs of acute bony deformity, erythema, ecchymosis.  There is trace to grade 1 joint effusion.  The patella is without tenderness. Apprehension is negative with medial and lateral glide. Patella crepitus is positive. Patella grind is negative. The medial joint line is tender and without bony crepitus or step-off. The lateral joint line is nontender and without bony crepitus or step-off.  There is tenderness to palpation over distal MCL insertion.  Flexion &  extension are full and symmetrical.  Valgus stress test at 0 degrees and 30 degrees of flexion elicited medial joint line laxity with associated pain that improved with return to neutral position.  Varus stress test at 0° and 30° of flexion, Lachman's, and posterior drawer are all negative.  Positive Riana's.  The opposite knee is nontender and stable. Gait is slightly antalgic, minimally painful, and tandem.     Imaging:  Radiographs of the right knee obtained 7/26/2024 were reviewed and revealed mild medial compartment DJD, otherwise no evidence of acute osseous abnormalities.    The studies were reviewed by me personally in the office today.    Problem List Items Addressed This Visit    None  Visit Diagnoses         Codes    Right knee pain, unspecified chronicity    -  Primary M25.561    Relevant Medications    meloxicam (Mobic) 15 mg tablet    Sprain of medial collateral ligament of right knee, initial encounter     S83.411A    Relevant Orders    Referral to Physical Therapy    Knee Brace, Hinged    Primary osteoarthritis of both knees     M17.0    Relevant Orders    Referral to Physical Therapy    Acute medial meniscus tear of right knee, initial encounter     S83.241A    Relevant Orders    Referral to Physical Therapy    Knee Brace, Hinged          Assessment and Plan:    We reviewed the exam and imaging findings and discussed the conservative and surgical treatment options. We agreed to start with conservative management for suspected acute pain flare from exacerbation of medial joint line DJD with suspected degenerative meniscus tearing and MCL sprain given he has already had significant improvement since pain onset. Physical therapy referral provided and discussed the importance of regular home exercises.  Prescribed Econo hinged knee brace to be used with daily activity and exercise for added stability and comfort.  Recommended prescription doses of Tylenol, Voltaren gel, and encouraged use of ice or  heat as needed for acute flares of pain.  Prescribed short course of meloxicam to be taken once daily with food as needed for acute flares of pain.  Instructed patient to avoid other NSAIDs when taking this medication.  Plan for follow-up in 6 weeks for re-evaluation, otherwise may follow-up sooner if any new concerns arise.  Discussed this plan with the patient who is understanding and agreeable.    Patient was prescribed a Econo hinged knee brace for right knee medial joint line instability with associated pain. The patient is ambulatory with or without aid; but, has weakness, instability and/or deformity of their right knee which requires stabilization from this orthosis to improve their function.      Verbal and written instructions for the use, wear schedule, cleaning and application of this item were given.  Patient was instructed that should the brace result in increased pain, decreased sensation, increased swelling, or an overall worsening of their medical condition, to please contact our office immediately.     Orthotic management and training was provided for skin care, modifications due to healing tissues, edema changes, interruption in skin integrity, and safety precautions with the orthosis.     **This note was dictated using Dragon speech recognition software and was not corrected for spelling or grammatical errors**.    Alan Argueta DO  Primary Care Sports Medicine  [facility] [place]

## 2025-01-23 ENCOUNTER — APPOINTMENT (OUTPATIENT)
Dept: OPHTHALMOLOGY | Facility: CLINIC | Age: 60
End: 2025-01-23
Payer: COMMERCIAL

## 2025-02-03 ENCOUNTER — APPOINTMENT (OUTPATIENT)
Dept: PRIMARY CARE | Facility: CLINIC | Age: 60
End: 2025-02-03
Payer: COMMERCIAL

## 2025-02-03 DIAGNOSIS — I10 ESSENTIAL HYPERTENSION, BENIGN: ICD-10-CM

## 2025-02-03 RX ORDER — AMLODIPINE BESYLATE 10 MG/1
10 TABLET ORAL DAILY
Qty: 90 TABLET | Refills: 0 | Status: SHIPPED | OUTPATIENT
Start: 2025-02-03

## 2025-02-05 DIAGNOSIS — M25.561 RIGHT KNEE PAIN, UNSPECIFIED CHRONICITY: ICD-10-CM

## 2025-02-05 RX ORDER — MELOXICAM 15 MG/1
15 TABLET ORAL DAILY
Qty: 30 TABLET | Refills: 0 | Status: SHIPPED | OUTPATIENT
Start: 2025-02-05

## 2025-02-10 LAB
ALBUMIN SERPL-MCNC: 4.2 G/DL (ref 3.6–5.1)
ALBUMIN/CREAT UR: 7 MG/G CREAT
ALP SERPL-CCNC: 85 U/L (ref 35–144)
ALT SERPL-CCNC: 11 U/L (ref 9–46)
ANION GAP SERPL CALCULATED.4IONS-SCNC: 8 MMOL/L (CALC) (ref 7–17)
APPEARANCE UR: CLEAR
AST SERPL-CCNC: 18 U/L (ref 10–35)
BILIRUB SERPL-MCNC: 0.6 MG/DL (ref 0.2–1.2)
BILIRUB UR QL STRIP: NEGATIVE
BUN SERPL-MCNC: 14 MG/DL (ref 7–25)
CALCIUM SERPL-MCNC: 9 MG/DL (ref 8.6–10.3)
CHLORIDE SERPL-SCNC: 101 MMOL/L (ref 98–110)
CO2 SERPL-SCNC: 30 MMOL/L (ref 20–32)
COLOR UR: YELLOW
CREAT SERPL-MCNC: 0.68 MG/DL (ref 0.7–1.35)
CREAT UR-MCNC: 141 MG/DL (ref 20–320)
EGFRCR SERPLBLD CKD-EPI 2021: 106 ML/MIN/1.73M2
EST. AVERAGE GLUCOSE BLD GHB EST-MCNC: 160 MG/DL
EST. AVERAGE GLUCOSE BLD GHB EST-SCNC: 8.9 MMOL/L
GLUCOSE SERPL-MCNC: 168 MG/DL (ref 65–99)
GLUCOSE UR QL STRIP: ABNORMAL
HBA1C MFR BLD: 7.2 % OF TOTAL HGB
HGB UR QL STRIP: NEGATIVE
KETONES UR QL STRIP: NEGATIVE
LEUKOCYTE ESTERASE UR QL STRIP: NEGATIVE
MICROALBUMIN UR-MCNC: 1 MG/DL
NITRITE UR QL STRIP: NEGATIVE
PH UR STRIP: 8 [PH] (ref 5–8)
POTASSIUM SERPL-SCNC: 4.7 MMOL/L (ref 3.5–5.3)
PROT SERPL-MCNC: 7.5 G/DL (ref 6.1–8.1)
PROT UR QL STRIP: NEGATIVE
SODIUM SERPL-SCNC: 139 MMOL/L (ref 135–146)
SP GR UR STRIP: 1.02 (ref 1–1.03)

## 2025-02-14 ENCOUNTER — APPOINTMENT (OUTPATIENT)
Dept: PRIMARY CARE | Facility: CLINIC | Age: 60
End: 2025-02-14
Payer: COMMERCIAL

## 2025-02-14 ENCOUNTER — TELEPHONE (OUTPATIENT)
Dept: PRIMARY CARE | Facility: CLINIC | Age: 60
End: 2025-02-14

## 2025-02-14 VITALS
SYSTOLIC BLOOD PRESSURE: 126 MMHG | BODY MASS INDEX: 29.41 KG/M2 | DIASTOLIC BLOOD PRESSURE: 72 MMHG | WEIGHT: 205 LBS | OXYGEN SATURATION: 97 % | HEART RATE: 69 BPM

## 2025-02-14 DIAGNOSIS — E11.65 TYPE 2 DIABETES MELLITUS WITH HYPERGLYCEMIA, WITHOUT LONG-TERM CURRENT USE OF INSULIN: Primary | ICD-10-CM

## 2025-02-14 DIAGNOSIS — E11.65 TYPE 2 DIABETES MELLITUS WITH HYPERGLYCEMIA, WITHOUT LONG-TERM CURRENT USE OF INSULIN: ICD-10-CM

## 2025-02-14 DIAGNOSIS — I10 ESSENTIAL HYPERTENSION, BENIGN: ICD-10-CM

## 2025-02-14 PROCEDURE — 99214 OFFICE O/P EST MOD 30 MIN: CPT

## 2025-02-14 PROCEDURE — 3078F DIAST BP <80 MM HG: CPT

## 2025-02-14 PROCEDURE — 3074F SYST BP LT 130 MM HG: CPT

## 2025-02-14 PROCEDURE — 4010F ACE/ARB THERAPY RXD/TAKEN: CPT

## 2025-02-14 RX ORDER — DAPAGLIFLOZIN 10 MG/1
10 TABLET, FILM COATED ORAL DAILY
Qty: 30 TABLET | Refills: 0 | Status: SHIPPED | OUTPATIENT
Start: 2025-02-14 | End: 2025-03-16

## 2025-02-14 ASSESSMENT — PAIN SCALES - GENERAL: PAINLEVEL_OUTOF10: 0-NO PAIN

## 2025-02-14 NOTE — PROGRESS NOTES
Subjective   Patient ID: Robbie Gomez is a 59 y.o. male who presents for Diabetes.    HPI   DM: On Glipizide 10mg BID, Metformin 850mg BID. Last A1C - 7.2 up from 6.9. Does not check home BS. Last microalbumin - 7 and GFR - 106. On statin/ACEI. Few episodes of low BS over past few months. Denies chest pain, shortness of breath, headache.     HTN: On Lisinopril 20mg, Atenolol 50mg BID, Amlodipine 10mg. No medication side effects. Denies chest pain, heart palpitations, SOB, dizziness, headaches, changes of vision, syncope, leg swelling.     Review of Systems  All other systems have been reviewed and are negative except as noted in the HPI.     Objective   /72 (BP Location: Left arm)   Pulse 69   Wt 93 kg (205 lb)   SpO2 97%   BMI 29.41 kg/m²     Physical Exam  Vitals and nursing note reviewed.   Constitutional:       General: He is not in acute distress.  Eyes:      Extraocular Movements: Extraocular movements intact.      Conjunctiva/sclera: Conjunctivae normal.   Neck:      Vascular: No carotid bruit.   Cardiovascular:      Rate and Rhythm: Normal rate and regular rhythm.   Pulmonary:      Effort: Pulmonary effort is normal.      Breath sounds: Normal breath sounds.   Musculoskeletal:      Cervical back: Neck supple.      Right lower leg: No edema.      Left lower leg: No edema.   Neurological:      General: No focal deficit present.      Mental Status: He is alert.   Psychiatric:         Mood and Affect: Mood normal.         Assessment/Plan   Assessment & Plan  Type 2 diabetes mellitus with hyperglycemia, without long-term current use of insulin  Chronic, needs better control. Discontinue Glipizide. Start Farxiga 10mg daily. Risks and benefits of medication discussed and prescribed. Continue Metformin 850mg BID. Low carbohydrate diet and increase in activity. Recheck in 1 month with Melina Olguin and 3 months with me.     Orders:    dapagliflozin propanediol (Farxiga) 10 mg; Take 1 tablet (10 mg) by  mouth once daily. Hold if not eating or drinking as normal.    Essential hypertension, benign  Chronic. Continue Lisinopril 20mg, Atenolol 50mg BID, Amlodipine 10mg. DASH diet and 30 minutes of exercise 5x/week. Check home BP and keep log. Recheck in 6 months.

## 2025-02-16 ENCOUNTER — PATIENT MESSAGE (OUTPATIENT)
Dept: PRIMARY CARE | Facility: CLINIC | Age: 60
End: 2025-02-16
Payer: COMMERCIAL

## 2025-02-16 DIAGNOSIS — E78.00 HYPERCHOLESTEREMIA: ICD-10-CM

## 2025-02-17 RX ORDER — SIMVASTATIN 40 MG/1
80 TABLET, FILM COATED ORAL NIGHTLY
Qty: 180 TABLET | Refills: 1 | OUTPATIENT
Start: 2025-02-17 | End: 2025-08-16

## 2025-02-17 NOTE — ASSESSMENT & PLAN NOTE
Chronic, needs better control. Discontinue Glipizide. Start Farxiga 10mg daily. Risks and benefits of medication discussed and prescribed. Continue Metformin 850mg BID. Low carbohydrate diet and increase in activity. Recheck in 1 month with Melina Olguin and 3 months with me.     Orders:    dapagliflozin propanediol (Farxiga) 10 mg; Take 1 tablet (10 mg) by mouth once daily. Hold if not eating or drinking as normal.

## 2025-02-17 NOTE — ASSESSMENT & PLAN NOTE
Chronic. Continue Lisinopril 20mg, Atenolol 50mg BID, Amlodipine 10mg. DASH diet and 30 minutes of exercise 5x/week. Check home BP and keep log. Recheck in 6 months.

## 2025-02-18 NOTE — TELEPHONE ENCOUNTER
Telephone call to patient to inform of Simvastatin 40mg sent to [facility]. Was unable to reach patient LM.

## 2025-02-24 ENCOUNTER — PATIENT MESSAGE (OUTPATIENT)
Dept: PRIMARY CARE | Facility: CLINIC | Age: 60
End: 2025-02-24
Payer: COMMERCIAL

## 2025-02-24 DIAGNOSIS — I10 HYPERTENSION, UNSPECIFIED TYPE: ICD-10-CM

## 2025-02-24 DIAGNOSIS — E78.00 HYPERCHOLESTEREMIA: ICD-10-CM

## 2025-02-25 RX ORDER — SIMVASTATIN 40 MG/1
40 TABLET, FILM COATED ORAL NIGHTLY
Qty: 90 TABLET | Refills: 0 | Status: SHIPPED | OUTPATIENT
Start: 2025-02-25 | End: 2025-05-26

## 2025-02-25 RX ORDER — LISINOPRIL 20 MG/1
20 TABLET ORAL DAILY
Qty: 90 TABLET | Refills: 0 | Status: SHIPPED | OUTPATIENT
Start: 2025-02-25 | End: 2025-05-26

## 2025-02-25 RX ORDER — ATENOLOL 50 MG/1
50 TABLET ORAL 2 TIMES DAILY
Qty: 180 TABLET | Refills: 0 | Status: SHIPPED | OUTPATIENT
Start: 2025-02-25 | End: 2025-05-26

## 2025-02-26 ASSESSMENT — ACTIVITIES OF DAILY LIVING (ADL): POOR_BALANCE: 1

## 2025-02-26 NOTE — PROGRESS NOTES
Physical Therapy Evaluation and Treatment     Patient Name: Robbie Gomez  MRN: [id number]  Encounter date: 2025  Time Calculation  Start Time: 1700  Stop Time: 1745  Time Calculation (min): 45 min  PT Evaluation Time Entry  PT Evaluation (Moderate) Time Entry: 30  PT Therapeutic Procedures Time Entry  Therapeutic Exercise Time Entry: 10    Visit # 1 of 8  Visits/Dates Authorized: [facility] Traditional - Auth required / $25 COPAY / $1600 OOP not met / 30V pt/ot - 0 used per Epic,    Current Problem:   Problem List Items Addressed This Visit             ICD-10-CM    Sprain of medial collateral ligament of right knee S83.411A    Relevant Orders    Follow Up In Physical Therapy    Primary osteoarthritis of both knees M17.0    Relevant Orders    Follow Up In Physical Therapy    Acute medial meniscus tear of right knee S83.241A    Relevant Orders    Follow Up In Physical Therapy     Precautions:      Diabetes    Subjective Evaluation    History of Present Illness  Date of onset: 2024  Mechanism of injury: 58 yo male presents with c/o R >L knee pain. Pain started after helping push a tractor out at the end of December. Pain is mostly along the medial side of his knee. He saw an orthopedic office who diagnosed him with a MCL sprain and degenerative meniscus tearing. Pt currently wears an OTC knee brace and also takes naproxen to help his pain. Pt states he walks for exercise. Pt states he used to be able to walk 30-45 minutes per day but now he can't due to knee pain.     Quality of life: good    Pain  Current pain ratin  At best pain ratin  At worst pain ratin  Location: medial knee  Quality: dull ache  Relieving factors: rest  Aggravating factors: movement (twisting or turnin on his knee, kneeling on his knee)  Progression: worsening    Diagnostic Tests  X-ray: abnormal (mild medial compartment DJD)    Treatments  Current treatment: medication  Current treatment comments: knee brace.   Patient  Goals  Patient goals for therapy: decreased edema, decreased pain, improved balance, increased motion, increased strength and return to sport/leisure activities  Patient goal: walk without knee pain       Pain Assessment: 0-10  0-10 (Numeric) Pain Score: 0 - No pain    Objective     Neurological Testing     Sensation     Knee   Left Knee   Intact: Light touch    Right Knee   Intact: light touch     Tenderness   Left Knee   Tenderness in the medial joint line and medial retinaculum. No tenderness in the LCL (distal), LCL (proximal), MCL (distal), MCL (proximal), patellar tendon and quadriceps tendon.     Right Knee   Tenderness in the lateral joint line, medial joint line and medial retinaculum. No tenderness in the LCL (distal), LCL (proximal), MCL (distal), MCL (proximal), patellar tendon and quadriceps tendon.     Active Range of Motion   Left Knee   Flexion: 130 degrees   Extension: 0 degrees     Right Knee   Flexion: 130 degrees   Extension: 0 degrees     Strength/Myotome Testing     Left Hip   Planes of Motion   Flexion: 4  Extension: 4  Abduction: 4  Adduction: 4  External rotation: 4  Internal rotation: 4    Right Hip   Planes of Motion   Flexion: 4  Extension: 4  Abduction: 4  Adduction: 4  External rotation: 4  Internal rotation: 4    Left Knee   Flexion: 4  Extension: 4    Right Knee   Flexion: 4  Extension: 4    Left Ankle/Foot   Dorsiflexion: 4+  Plantar flexion: 4+    Right Ankle/Foot   Dorsiflexion: 4+  Plantar flexion: 4+    Tests     Left Knee   Negative anterior drawer, lateral Riana, medial Riana, posterior drawer, valgus stress test at 0 degrees, valgus stress test at 30 degrees, varus stress test at 0 degrees and varus stress test at 30 degrees.     Right Knee   Positive lateral Riana and medial Riana.   Negative anterior drawer, posterior drawer, valgus stress test at 0 degrees, valgus stress test at 30 degrees, varus stress test at 0 degrees and varus stress test at 30 degrees.      Ambulation     Observational Gait   Gait: within functional limits     General Comments     Knee Comments  No visible swelling noted        Other Outcome Measures:  Other Measures  Other Outcome Measures: WOMAC: 25% impairment    Treatments:     Therapeutic Exercise [id number]:   Access Code: [email or web address]  URL: [email or web address]  Date: 02/27/2025  Prepared by: Yonny Rodriguez    Exercises  - Sidelying Hip Abduction  - 1 x daily - 3 x weekly - 2 sets - 12 reps  - Prone Hip Extension  - 1 x daily - 3 x weekly - 2 sets - 12 reps  - Sidelying Hip Adduction  - 1 x daily - 3 x weekly - 2 sets - 12 reps  - Supine Active Straight Leg Raise  - 1 x daily - 3 x weekly - 2 sets - 12 reps  - Heel Raises with Counter Support  - 1 x daily - 3 x weekly - 2 sets - 12 reps  - Sit to Stand Without Arm Support  - 1 x daily - 3 x weekly - 2 sets - 8 reps  - Upright Bike  - 1 x daily - 3 x weekly - 1 sets - 1 reps - 5 hold  - Step Up  - 1 x daily - 3 x weekly - 2 sets - 8 reps      HEP / Access Codes:   Access Code: [email or web address]  URL: [email or web address]  Date: 02/27/2025  Prepared by: Yonny Rodriguez    Exercises  - Sidelying Hip Abduction  - 1 x daily - 3 x weekly - 2 sets - 12 reps  - Prone Hip Extension  - 1 x daily - 3 x weekly - 2 sets - 12 reps  - Sidelying Hip Adduction  - 1 x daily - 3 x weekly - 2 sets - 12 reps  - Supine Active Straight Leg Raise  - 1 x daily - 3 x weekly - 2 sets - 12 reps  - Heel Raises with Counter Support  - 1 x daily - 3 x weekly - 2 sets - 12 reps  - Sit to Stand Without Arm Support  - 1 x daily - 3 x weekly - 2 sets - 8 reps  - Upright Bike  - 1 x daily - 3 x weekly - 1 sets - 1 reps - 5 hold  - Step Up  - 1 x daily - 3 x weekly - 2 sets - 8 reps    Assessment & Plan     Assessment  Impairments: abnormal gait, abnormal muscle tone, abnormal or restricted ROM, impaired balance, impaired physical strength, lacks appropriate home exercise program and pain with function  Assessment details: Patient  presents with R>L knee pain with mobility deficits. Key impairments include: decreased ROM and strength, and pain with functional activities such as squatting, walking, and stairs. Patient would benefit from skilled physical therapy services to address these impairments and restore normal ROM and strength to reduce pain and improving activity participation.    Prognosis: good    Goals  walk without knee pain    Plan  Therapy options: will be seen for skilled physical therapy services  Planned modality interventions: cryotherapy, thermotherapy (hydrocollator packs) and TENS  Other planned modality interventions: light therapy, dry needling  Planned therapy interventions: manual therapy, joint mobilization, gait training, home exercise program, functional ROM exercises, flexibility, stretching, strengthening, neuromuscular re-education and balance/weight-bearing training  Frequency: 1x week  Duration in visits: 8  Duration in weeks: 8  Treatment plan discussed with: patient  Plan details: 1x week for 8 visits.       Moderate complexity due to patient's clinical presentation being evolving with changing characteristics, with comorbidities/complexities to include diabetes, sedentary lifestyle, and age, all of which may negatively impact rehab tolerance and progression.     Post-Treatment Symptoms:  Response to Interventions: No change in pain    Goals:   Active       PT Problem       PT Goal 1       Start:  02/27/25       1) Pt will report pain levels no greater than 2/10 at worst with activity for less difficulty with active knee movement, standing, walking, transferring, and negotiating stairs.  2) Pt will display improved pain-free bilateral knee AROM WFL for less difficulty walking, squatting, transferring, performing bed mobility, and negotiating stairs.  3) Pt will display improved pain-free hip and knee MMT of at least 5/5 for all major muscle groups for improved functional strength with walking, transferring,  squatting, and negotiating stairs.  4) Pt will display symmetrical pain-free gait without AD and without compensation.  5) Pt will improve WOMAC to <10% impairment to indicate minimal knee dysfunction.

## 2025-02-27 ENCOUNTER — EVALUATION (OUTPATIENT)
Dept: PHYSICAL THERAPY | Facility: CLINIC | Age: 60
End: 2025-02-27
Payer: COMMERCIAL

## 2025-02-27 DIAGNOSIS — M17.0 PRIMARY OSTEOARTHRITIS OF BOTH KNEES: ICD-10-CM

## 2025-02-27 DIAGNOSIS — S83.241A ACUTE MEDIAL MENISCUS TEAR OF RIGHT KNEE, INITIAL ENCOUNTER: ICD-10-CM

## 2025-02-27 DIAGNOSIS — S83.411A SPRAIN OF MEDIAL COLLATERAL LIGAMENT OF RIGHT KNEE, INITIAL ENCOUNTER: ICD-10-CM

## 2025-02-27 PROCEDURE — 97162 PT EVAL MOD COMPLEX 30 MIN: CPT | Mod: GP

## 2025-02-27 SDOH — ECONOMIC STABILITY: GENERAL: QUALITY OF LIFE: GOOD

## 2025-02-27 ASSESSMENT — ENCOUNTER SYMPTOMS
QUALITY: DULL ACHE
ALLEVIATING FACTORS: REST
PAIN SCALE: 0
PAIN SCALE AT LOWEST: 0
PAIN SCALE AT HIGHEST: 4
PAIN LOCATION: MEDIAL KNEE
EXACERBATED BY: MOVEMENT

## 2025-02-27 ASSESSMENT — PAIN - FUNCTIONAL ASSESSMENT: PAIN_FUNCTIONAL_ASSESSMENT: 0-10

## 2025-02-27 ASSESSMENT — PAIN SCALES - GENERAL: PAINLEVEL_OUTOF10: 0 - NO PAIN

## 2025-03-06 ENCOUNTER — OFFICE VISIT (OUTPATIENT)
Dept: ORTHOPEDIC SURGERY | Facility: CLINIC | Age: 60
End: 2025-03-06
Payer: COMMERCIAL

## 2025-03-06 VITALS — BODY MASS INDEX: 28 KG/M2 | WEIGHT: 200 LBS | HEIGHT: 71 IN

## 2025-03-06 DIAGNOSIS — M17.0 PRIMARY OSTEOARTHRITIS OF BOTH KNEES: Primary | ICD-10-CM

## 2025-03-06 PROCEDURE — 4010F ACE/ARB THERAPY RXD/TAKEN: CPT | Performed by: STUDENT IN AN ORGANIZED HEALTH CARE EDUCATION/TRAINING PROGRAM

## 2025-03-06 PROCEDURE — 99214 OFFICE O/P EST MOD 30 MIN: CPT | Performed by: STUDENT IN AN ORGANIZED HEALTH CARE EDUCATION/TRAINING PROGRAM

## 2025-03-06 PROCEDURE — 3008F BODY MASS INDEX DOCD: CPT | Performed by: STUDENT IN AN ORGANIZED HEALTH CARE EDUCATION/TRAINING PROGRAM

## 2025-03-06 PROCEDURE — 1036F TOBACCO NON-USER: CPT | Performed by: STUDENT IN AN ORGANIZED HEALTH CARE EDUCATION/TRAINING PROGRAM

## 2025-03-06 ASSESSMENT — PAIN SCALES - GENERAL: PAINLEVEL_OUTOF10: 0 - NO PAIN

## 2025-03-06 ASSESSMENT — PAIN - FUNCTIONAL ASSESSMENT: PAIN_FUNCTIONAL_ASSESSMENT: 0-10

## 2025-03-06 NOTE — PROGRESS NOTES
Sports Medicine Office Note    Today's Date:  03/06/2025     HPI: Robbie Gomez is a 59 y.o. male with history of left knee DJD, NIDDM (last hemoglobin A1c 7.2 on 2/8/2025) who presents today for follow-up of chronic bilateral knee pain.      1/9/2025: He states that he has had some pain in his right knee for roughly 6 months, then 11 days ago was helping push a tractor, then 2 days after developed worsening of right knee pain.  States he may have mild swelling of his right knee, but denies any redness, warmth, bruising, radiation of pain, numbness, tingling, weakness.  Denies any mechanical locking/catching.  States he feels occasionally unstable on his right knee, but denies any event where he felt his right knee give out.  He was seen at urgent care and was prescribed short course of naproxen, advised to take Tylenol as needed in addition to rest, ice, compression, and elevation.  Of note, at that time patient declined radiographs.  He has been using OTC knee brace which provides minimal relief.  States his pain has improved since initial onset, currently stating pain is roughly 2/10.  However, he states pain is worse at night, particularly when there is any pressure over medial aspect of his right knee.  States pain is primarily in medial aspect which is where he has had chronic pain.  States he just ran out of naproxen which he feels was giving him some relief.     3/6/2025: He presents today for follow-up of chronic bilateral knee pain.  He has been in to physical therapy for his first visit on 2/27/2025 and plans to continue with physical therapy.  States he has been doing home exercises for the last few days, and is unsure if he has had any improvement as he states his pain is relatively manageable with minimal to no pain during the day, but pain at night when he rolls over in bed.  States his pain is minimal though, rating it 2/10, and relatively tolerable.  Denies any nighttime awakening due to pain.   Denies any significant daily activity or exercise limitations due to his pain.  We previously prescribed Econo hinged knee brace, but he has not been using this brace as he has not felt he needs it.  He has taken roughly 3 doses of meloxicam, but is unsure if he had any relief from this medication.  He has not taking any other medications for his pain.  Denies any interval injury/trauma.  States pain is over medial aspect of both knees and particularly worse at night, but otherwise relatively pain-free.  He denies any significant radiation of pain, numbness, tingling, weakness, swelling, redness, warmth, bruising, or mechanical locking/catching.    He has no other complaints.    Physical Examination:     The RIGHT knee has trace to grade 1 joint effusion. Patella crepitus and grind are positive. There is minimal tenderness to the medial joint line and no tenderness to the lateral joint line. Flexion and extension are without mechanical blocking. Valgus stress test at 0 degrees and 30 degrees of flexion elicited medial joint line laxity with associated pain that improved with return to neutral position.  Otherwise, there is no instability with stress testing.  The LEFT knee has trace to grade 1 joint effusion. Patella crepitus and grind are positive. There is minimal tenderness to the medial joint line and no tenderness to the lateral joint line. Flexion and extension are without mechanical blocking. Valgus stress test at 0 degrees and 30 degrees of flexion elicited medial joint line laxity with associated pain that improved with return to neutral position. There is no instability with stress testing.  Skin - no rashes, sores, or open lesions. Strength, sensory and vascular exams are otherwise normal. There is no clubbing, cyanosis or edema.  Gait is minimally antalgic, nonpainful, and tandem.     Imaging:  Radiographs of the left knee obtained 7/12/2023 were reviewed and revealed moderate medial compartment DJD,  otherwise no evidence of acute osseous abnormalities.    MRI of the left knee obtained on 1/30/2024 was reviewed and revealed grade III chondromalacia of weightbearing surface of medial femoral-tibial compartment with degenerative meniscus tearing.    Radiographs of the right knee obtained 7/26/2024 were reviewed and revealed mild medial compartment DJD, otherwise no evidence of acute osseous abnormalities.     The studies were reviewed by me personally in the office today.    Problem List Items Addressed This Visit             ICD-10-CM    Primary osteoarthritis of both knees - Primary M17.0     Assessment and Plan:    We reviewed the exam and imaging findings and discussed the conservative and surgical treatment options. We agreed to continue with conservative management of chronic bilateral knee pain with bilateral knee DJD, worse in medial compartments.  We discussed the chronicity and progressive nature of knee osteoarthritis and options for managing chronic pain including anti-inflammatory/analgesics, topical anti-inflammatories, ice/heat, activity modification/rest, weight loss, compression, bracing, physical therapy, and injections.  Discussed with patient that all of these potential interventions are an attempt to manage pain knowing that he will eventually need total knee arthroplasty for definitive treatment of knee arthritis, however he is not currently near needing TKA as we have not exhausted all of these conservative options.  Therefore, encouraged him to continue with physical therapy as scheduled and discussed the importance of continuing regular home exercises.  He may use Econo hinged knee brace as previously provided for added comfort and stability.  Recommended prescription doses of Tylenol, Voltaren gel, and encouraged use of ice or heat as needed for acute flares of pain.  He may otherwise take meloxicam as previously prescribed and should avoid other NSAIDs when taking this medication.   Because his pain is overall very manageable and minimal, intra-articular injections would be an appropriate at this time, but may consider in the future if pain worsens with current conservative strategies.  Plan for follow-up as needed if pain worsens or if any new concerns arise.  Discussed this plan with the patient who is understanding and agreeable.    **This note was dictated using Dragon speech recognition software and was not corrected for spelling or grammatical errors**.    Alan Argueta DO  Primary Care Sports Medicine  [facility] [place]

## 2025-03-17 ENCOUNTER — TREATMENT (OUTPATIENT)
Dept: PHYSICAL THERAPY | Facility: CLINIC | Age: 60
End: 2025-03-17
Payer: COMMERCIAL

## 2025-03-17 DIAGNOSIS — M17.0 PRIMARY OSTEOARTHRITIS OF BOTH KNEES: ICD-10-CM

## 2025-03-17 DIAGNOSIS — S83.241A ACUTE MEDIAL MENISCUS TEAR OF RIGHT KNEE, INITIAL ENCOUNTER: ICD-10-CM

## 2025-03-17 DIAGNOSIS — S83.411A SPRAIN OF MEDIAL COLLATERAL LIGAMENT OF RIGHT KNEE, INITIAL ENCOUNTER: ICD-10-CM

## 2025-03-17 PROCEDURE — 97110 THERAPEUTIC EXERCISES: CPT | Mod: GP,CQ

## 2025-03-17 ASSESSMENT — PAIN - FUNCTIONAL ASSESSMENT: PAIN_FUNCTIONAL_ASSESSMENT: 0-10

## 2025-03-17 ASSESSMENT — PAIN SCALES - GENERAL: PAINLEVEL_OUTOF10: 0 - NO PAIN

## 2025-03-17 NOTE — PROGRESS NOTES
"Physical Therapy Treatment    Patient Name: Robbie Gomez  MRN: [id number]  Encounter date: 3/17/2025    Time Calculation  Start Time: 1545  Stop Time: 1628  Time Calculation (min): 43 min  PT Therapeutic Procedures Time Entry  Therapeutic Exercise Time Entry: 43    Visit # 2 of 8  Visits/Dates Authorized: EVAL ONLY-Auth Rcvd 8 visits 2/27-5/11 CPTs 17343 84734 [id number] 47094 92904/, 20560/20561  [facility] Traditional - Auth required / $25 COPAY / $1600 OOP not met / 30V pt/ot - 0 used per Epic, check w/pt / ds 2/25/25.   CPT 12424 - DN is covered //     Current Problem:   Problem List Items Addressed This Visit             ICD-10-CM    Sprain of medial collateral ligament of right knee S83.411A    Primary osteoarthritis of both knees M17.0    Acute medial meniscus tear of right knee S83.241A     Precautions:    Diabetes       Subjective   General:    Patient states HEP is going well, no issues with them. Patient reports he only gets knee pain when sleeping at night, side sleeper. Patient states he is feeling tired today, just got off work.     Pre-Treatment Symptoms:   Pain Assessment: 0-10  0-10 (Numeric) Pain Score: 0 - No pain    Objective   Findings:   Tenderness in the medial joint line and medial retinaculum of R and L knee             Treatments:      Therapeutic Exercise 78059:   Airdyne 5 min  Supine Active Straight Leg Raise 2x12  Sidelying Hip Abduction 1x12 R/L   Sidelying Hip Adduction 1x12 R/L   S/L clamshell blue 2x12 R/L   S/L reverse clamshell no resist 1x12 , light green 1x12 R/L   Bridge 1x5  HL hip adduction isometric 10x3\" holds , with bridge 10x3\" holds  Shuttle leg press DL # 2x12  , SL 37# 1x12 (challenging on R , some discomfort)   HEP updated and reviewed     Manual Therapy 91846:   DNP    Modalities:   DNP    HEP / Access Codes: URL: [email or web address]  Access Code: [email or web address]   Date: 02/27/2025  - Sidelying Hip Abduction  - 1 x daily - 3 x weekly - 2 sets - 12 reps  - " Prone Hip Extension  - 1 x daily - 3 x weekly - 2 sets - 12 reps  - Sidelying Hip Adduction  - 1 x daily - 3 x weekly - 2 sets - 12 reps  - Supine Active Straight Leg Raise  - 1 x daily - 3 x weekly - 2 sets - 12 reps  - Heel Raises with Counter Support  - 1 x daily - 3 x weekly - 2 sets - 12 reps  - Sit to Stand Without Arm Support  - 1 x daily - 3 x weekly - 2 sets - 8 reps  - Upright Bike  - 1 x daily - 3 x weekly - 1 sets - 1 reps - 5 hold  - Step Up  - 1 x daily - 3 x weekly - 2 sets - 8 reps    Assessment:    Patient tolerated treatment well. Focused on HEP review and intro to additional strengthening exercises. Patient had some discomfort with reverse clamshells, required cushion between knees. Patient encouraged to continue with HEP compliance to maintain current progress, issued additional at end of session with Tbands.       Post-Treatment Symptoms:   Response to Interventions: Other (Comment) (sore in knees , R>L)    Plan:    Therapy options: will be seen for skilled physical therapy services  Planned modality interventions: cryotherapy, thermotherapy (hydrocollator packs) and TENS  Other planned modality interventions: light therapy, dry needling  Planned therapy interventions: manual therapy, joint mobilization, gait training, home exercise program, functional ROM exercises, flexibility, stretching, strengthening, neuromuscular re-education and balance/weight-bearing training  Frequency: 1x week  Duration in visits: 8  Duration in weeks: 8  Treatment plan discussed with: patient  Plan details: 1x week for 8 visits.    Goals:   Active       PT Problem       PT Goal 1       Start:  02/27/25       1) Pt will report pain levels no greater than 2/10 at worst with activity for less difficulty with active knee movement, standing, walking, transferring, and negotiating stairs.  2) Pt will display improved pain-free bilateral knee AROM WFL for less difficulty walking, squatting, transferring, performing bed  mobility, and negotiating stairs.  3) Pt will display improved pain-free hip and knee MMT of at least 5/5 for all major muscle groups for improved functional strength with walking, transferring, squatting, and negotiating stairs.  4) Pt will display symmetrical pain-free gait without AD and without compensation.  5) Pt will improve WOMAC to <10% impairment to indicate minimal knee dysfunction.

## 2025-03-21 ENCOUNTER — APPOINTMENT (OUTPATIENT)
Dept: PRIMARY CARE | Facility: CLINIC | Age: 60
End: 2025-03-21
Payer: COMMERCIAL

## 2025-03-21 VITALS — HEIGHT: 71 IN | BODY MASS INDEX: 27.72 KG/M2 | WEIGHT: 198 LBS

## 2025-03-21 DIAGNOSIS — E11.9 TYPE 2 DIABETES MELLITUS WITHOUT COMPLICATION, WITHOUT LONG-TERM CURRENT USE OF INSULIN: Primary | ICD-10-CM

## 2025-03-21 DIAGNOSIS — E11.9 TYPE 2 DIABETES MELLITUS WITHOUT COMPLICATION, UNSPECIFIED WHETHER LONG TERM INSULIN USE: ICD-10-CM

## 2025-03-21 RX ORDER — GLIPIZIDE 10 MG/1
10 TABLET ORAL
COMMUNITY
End: 2025-03-25 | Stop reason: SDUPTHER

## 2025-03-21 NOTE — PROGRESS NOTES
"[facility] (VBID)    Robbie Gomez is a 60 y.o. male was referred to Clinical Pharmacy Team to complete a comprehensive medication review (CMR) with a pharmacist as part of the [employer] diabetes program.  Pharmacy team may also provide assistance in diabetes management per discussion with referring provider and/or endocrinology.    Referring Provider: Daysi Estrada PA-C  Does patient follow with Endocrinology: No  Referring Provider: Daysi Estrada PA-C     Pt here today for DM education and VBID enrollment.  Recent A1c is 7.2%.  Taking glipizide 10mg bid,  Metformin 850mg bid.  Patient also states that he has a prescription for Simvastatin 40mg but has been taking 80mg daily.      Subjective   Allergies   Allergen Reactions    Farxiga [Dapagliflozin] Diarrhea       [facility] - [place], [place] - [address]  [address]  [facility] [address]  Phone: [phone] Fax: [phone]    [facility]/pharmacy #[id number] [place] [address] [place] [address]  Phone: [phone] Fax: [phone]      HPI    Objective     Ht 1.803 m (5' 10.98\")   Wt 89.8 kg (198 lb)   BMI 27.63 kg/m²      LAB  Lab Results   Component Value Date    BILITOT 0.6 02/08/2025    CALCIUM 9.0 02/08/2025    CO2 30 02/08/2025     02/08/2025    CREATININE 0.68 (L) 02/08/2025    GLUCOSE 168 (H) 02/08/2025    ALKPHOS 85 02/08/2025    K 4.7 02/08/2025    PROT 7.5 02/08/2025     02/08/2025    AST 18 02/08/2025    ALT 11 02/08/2025    BUN 14 02/08/2025    ANIONGAP 8 02/08/2025    ALBUMIN 4.2 02/08/2025    LIPASE 37 06/26/2021     Lab Results   Component Value Date    TRIG 50 08/03/2024    CHOL 153 08/03/2024    LDLCALC 96 08/03/2024    HDL 47.0 08/03/2024     Lab Results   Component Value Date    HGBA1C 7.2 (H) 02/08/2025     Estimated body mass index is " "27.63 kg/m² as calculated from the following:    Height as of this encounter: 1.803 m (5' 10.98\").    Weight as of this encounter: 89.8 kg (198 lb).     Current Outpatient Medications on File Prior to Visit   Medication Sig Dispense Refill    amLODIPine (Norvasc) 10 mg tablet Take 1 tablet by mouth once daily. 90 tablet 0    aspirin 81 mg EC tablet Take 1 tablet (81 mg) by mouth once daily.      atenolol (Tenormin) 50 mg tablet Take 1 tablet (50 mg) by mouth 2 times a day. 180 tablet 0    glipiZIDE (Glucotrol) 10 mg tablet Take 1 tablet (10 mg) by mouth 2 times a day before meals.      lisinopril 20 mg tablet Take 1 tablet (20 mg) by mouth once daily. 90 tablet 0    meloxicam (Mobic) 15 mg tablet TAKE 1 TABLET BY MOUTH EVERY DAY 30 tablet 0    metFORMIN (Glucophage) 850 mg tablet Take 1 tablet (850 mg) by mouth 2 times daily (morning and late afternoon). 180 tablet 1    omeprazole (PriLOSEC) 20 mg DR capsule Take 1 capsule (20 mg) by mouth once daily in the morning. Take before meals. 90 capsule 1    simvastatin (Zocor) 40 mg tablet Take 1 tablet (40 mg) by mouth once daily at bedtime. 90 tablet 0    [DISCONTINUED] dapagliflozin propanediol (Farxiga) 10 mg Take 1 tablet (10 mg) by mouth once daily. Hold if not eating or drinking as normal. 30 tablet 0     No current facility-administered medications on file prior to visit.        HISTORICAL PHARMACOTHERAPY (if relevant)- Did not tolerate Farxiga     DRUG INTERACTIONS  - n/a    Secondary Prevention:  Statin? Yes  ACE-I/ARB? Yes  Aspirin? Yes    Pertinent PMH Review:  PMH of Pancreatitis: No  PMH of Retinopathy: No  Has patient had a yearly eye exam? Yes  PMH of Urinary Tract Infections: No  PMH of MTC: No    Glucose Readings:  Glucometer/CGM Type: pt needs glucometer, strips, lancets   Patient tests BG 0 times per day    Current home BG readings: unknown    Previous home BG readings: unknown   Any episodes of hypoglycemia? N/A.  Did patient treat episode of " hypoglycemia appropriately? N/A    ASSESSMENT/PLAN:   Employee Health Diabetes Program (VBID)  - Patient enrolled in  Employee diabetes program for $0 co-pays on diabetes medications/supplies. Enrollment should be active in 2-4 weeks.   - Refills sent to  pharmacy for diabetes medications/supplies as part of  Employee Diabetes Program (subject to change per provider preferences).  - Requested VBID enrollment date: 03/25/25  - PharmD Management Level: 4  -  Pharmacy fill location: [facility] pharmacy     Assessment/Plan   Problem List Items Addressed This Visit       Diabetes mellitus (Multi) - Primary      Follow up: 2 months with PCP as prev. Scheduled     Continue all meds under the continuation of care with the referring provider and clinical pharmacy team.    Meilna Olguin [facility]     Verbal consent to manage patient's drug therapy was obtained from [the patient and/or an individual authorized to act on behalf of a patient]. They were informed they may decline to participate or withdraw from participation in pharmacy services at any time.

## 2025-03-25 RX ORDER — GLIPIZIDE 10 MG/1
10 TABLET ORAL
Qty: 180 TABLET | Refills: 1 | Status: SHIPPED | OUTPATIENT
Start: 2025-03-25

## 2025-03-25 RX ORDER — BLOOD SUGAR DIAGNOSTIC
STRIP MISCELLANEOUS
Qty: 100 EACH | Refills: 3 | Status: SHIPPED | OUTPATIENT
Start: 2025-03-25

## 2025-03-25 RX ORDER — METFORMIN HYDROCHLORIDE 850 MG/1
850 TABLET ORAL
Qty: 180 TABLET | Refills: 1 | Status: SHIPPED | OUTPATIENT
Start: 2025-03-25 | End: 2025-09-21

## 2025-03-25 RX ORDER — LANCETS 33 GAUGE
EACH MISCELLANEOUS
Qty: 100 EACH | Refills: 3 | Status: SHIPPED | OUTPATIENT
Start: 2025-03-25

## 2025-03-25 RX ORDER — DEXTROSE 4 G
TABLET,CHEWABLE ORAL
Qty: 1 EACH | Refills: 0 | Status: SHIPPED | OUTPATIENT
Start: 2025-03-25

## 2025-03-26 ENCOUNTER — APPOINTMENT (OUTPATIENT)
Dept: OPHTHALMOLOGY | Facility: CLINIC | Age: 60
End: 2025-03-26
Payer: COMMERCIAL

## 2025-03-27 ENCOUNTER — TREATMENT (OUTPATIENT)
Dept: PHYSICAL THERAPY | Facility: CLINIC | Age: 60
End: 2025-03-27
Payer: COMMERCIAL

## 2025-03-27 DIAGNOSIS — M17.0 PRIMARY OSTEOARTHRITIS OF BOTH KNEES: ICD-10-CM

## 2025-03-27 DIAGNOSIS — S83.411A SPRAIN OF MEDIAL COLLATERAL LIGAMENT OF RIGHT KNEE, INITIAL ENCOUNTER: ICD-10-CM

## 2025-03-27 DIAGNOSIS — S83.241A ACUTE MEDIAL MENISCUS TEAR OF RIGHT KNEE, INITIAL ENCOUNTER: ICD-10-CM

## 2025-03-27 PROCEDURE — 97110 THERAPEUTIC EXERCISES: CPT | Mod: GP

## 2025-03-27 ASSESSMENT — PAIN SCALES - GENERAL: PAINLEVEL_OUTOF10: 0 - NO PAIN

## 2025-03-27 ASSESSMENT — PAIN - FUNCTIONAL ASSESSMENT: PAIN_FUNCTIONAL_ASSESSMENT: 0-10

## 2025-03-27 NOTE — PROGRESS NOTES
"Physical Therapy Treatment    Patient Name: Robbie Gomez  MRN: [id number]  Encounter date: 3/27/2025    Time Calculation  Start Time: 1615  Stop Time: 1650  Time Calculation (min): 35 min  PT Therapeutic Procedures Time Entry  Therapeutic Exercise Time Entry: 32    Visit # 3 of 8  Visits/Dates Authorized: EVAL ONLY-Auth Rcvd 8 visits 2/27-5/11 CPTs 79183 47852 32625 25135 41097/, 20560/20561  [facility] Traditional - Auth required / $25 COPAY / $1600 OOP not met / 30V pt/ot - 0 used per Epic, check w/pt / ds 2/25/25.   CPT 06070 - DN is covered //     Current Problem:   Problem List Items Addressed This Visit             ICD-10-CM    Sprain of medial collateral ligament of right knee S83.411A    Primary osteoarthritis of both knees M17.0    Acute medial meniscus tear of right knee S83.241A     Precautions:    Diabetes       Subjective   General:    Patient reports just got off work, tired today. Had knee pain following previous session.     Pre-Treatment Symptoms:   Pain Assessment: 0-10  0-10 (Numeric) Pain Score: 0 - No pain    Objective   Findings:   Tenderness in the medial joint line and medial retinaculum of R and L knee             Treatments:      Therapeutic Exercise 77432:   Nustep lv 5 x4 min   DKTC Tball 2x15   Glute bridge Tball 2x10   HL hip abd (light TB) 2x12  HL hip flex light TB x10 L/R  Sit to stand elevated height 2x12  Staggered sit to stand L/R, R/L 2x8  Tloop side step light around knees 15 feet x 6 L/R  Monster walk pink Tloop around ankles 15 feet x 6 L/R      Deferred:  Airdyne 5 min  Supine Active Straight Leg Raise 2x12  Sidelying Hip Abduction 1x12 R/L   Sidelying Hip Adduction 1x12 R/L   S/L clamshell blue 2x12 R/L   S/L reverse clamshell no resist 1x12 , light green 1x12 R/L   Bridge 1x5  HL hip adduction isometric 10x3\" holds , with bridge 10x3\" holds  Shuttle leg press DL # 2x12  , SL 37# 1x12 (challenging on R , some discomfort)   HEP updated and reviewed     Manual " Therapy 70766:   AP/PA tibiofemoral grade III, IV   Tibial IR with knee flexion 2x15      HEP / Access Codes: URL: [email or web address]  Access Code: [email or web address]   Date: 02/27/2025  - Sidelying Hip Abduction  - 1 x daily - 3 x weekly - 2 sets - 12 reps  - Prone Hip Extension  - 1 x daily - 3 x weekly - 2 sets - 12 reps  - Sidelying Hip Adduction  - 1 x daily - 3 x weekly - 2 sets - 12 reps  - Supine Active Straight Leg Raise  - 1 x daily - 3 x weekly - 2 sets - 12 reps  - Heel Raises with Counter Support  - 1 x daily - 3 x weekly - 2 sets - 12 reps  - Sit to Stand Without Arm Support  - 1 x daily - 3 x weekly - 2 sets - 8 reps  - Upright Bike  - 1 x daily - 3 x weekly - 1 sets - 1 reps - 5 hold  - Step Up  - 1 x daily - 3 x weekly - 2 sets - 8 reps    Assessment:    Pt c/o some anterior knee pain after last visit. Pt's exercises were adjusted and he felt good with elevated sit to stands and staggered sit to stands. Pt continues to need knee ROM/strengthening to reduce his pain.     Post-Treatment Symptoms:   Response to Interventions: No change in pain    Plan:    Therapy options: will be seen for skilled physical therapy services  Planned modality interventions: cryotherapy, thermotherapy (hydrocollator packs) and TENS  Other planned modality interventions: light therapy, dry needling  Planned therapy interventions: manual therapy, joint mobilization, gait training, home exercise program, functional ROM exercises, flexibility, stretching, strengthening, neuromuscular re-education and balance/weight-bearing training  Frequency: 1x week  Duration in visits: 8  Duration in weeks: 8  Treatment plan discussed with: patient  Plan details: 1x week for 8 visits.    Goals:   Active       PT Problem       PT Goal 1       Start:  02/27/25       1) Pt will report pain levels no greater than 2/10 at worst with activity for less difficulty with active knee movement, standing, walking, transferring, and negotiating  stairs.  2) Pt will display improved pain-free bilateral knee AROM WFL for less difficulty walking, squatting, transferring, performing bed mobility, and negotiating stairs.  3) Pt will display improved pain-free hip and knee MMT of at least 5/5 for all major muscle groups for improved functional strength with walking, transferring, squatting, and negotiating stairs.  4) Pt will display symmetrical pain-free gait without AD and without compensation.  5) Pt will improve WOMAC to <10% impairment to indicate minimal knee dysfunction.

## 2025-03-28 ENCOUNTER — PHARMACY VISIT (OUTPATIENT)
Dept: PHARMACY | Facility: CLINIC | Age: 60
End: 2025-03-28
Payer: COMMERCIAL

## 2025-03-28 PROCEDURE — RXMED WILLOW AMBULATORY MEDICATION CHARGE

## 2025-04-03 ENCOUNTER — APPOINTMENT (OUTPATIENT)
Dept: PHYSICAL THERAPY | Facility: CLINIC | Age: 60
End: 2025-04-03
Payer: COMMERCIAL

## 2025-04-05 ENCOUNTER — OFFICE VISIT (OUTPATIENT)
Dept: URGENT CARE | Age: 60
End: 2025-04-05
Payer: COMMERCIAL

## 2025-04-05 VITALS
BODY MASS INDEX: 25.9 KG/M2 | DIASTOLIC BLOOD PRESSURE: 77 MMHG | SYSTOLIC BLOOD PRESSURE: 162 MMHG | OXYGEN SATURATION: 100 % | WEIGHT: 185 LBS | TEMPERATURE: 97.5 F | HEIGHT: 71 IN | RESPIRATION RATE: 16 BRPM | HEART RATE: 69 BPM

## 2025-04-05 DIAGNOSIS — R19.7 DIARRHEA, UNSPECIFIED TYPE: Primary | ICD-10-CM

## 2025-04-05 PROCEDURE — 4010F ACE/ARB THERAPY RXD/TAKEN: CPT | Performed by: NURSE PRACTITIONER

## 2025-04-05 PROCEDURE — 99213 OFFICE O/P EST LOW 20 MIN: CPT | Performed by: NURSE PRACTITIONER

## 2025-04-05 PROCEDURE — 3077F SYST BP >= 140 MM HG: CPT | Performed by: NURSE PRACTITIONER

## 2025-04-05 PROCEDURE — 1036F TOBACCO NON-USER: CPT | Performed by: NURSE PRACTITIONER

## 2025-04-05 PROCEDURE — 3008F BODY MASS INDEX DOCD: CPT | Performed by: NURSE PRACTITIONER

## 2025-04-05 PROCEDURE — 3078F DIAST BP <80 MM HG: CPT | Performed by: NURSE PRACTITIONER

## 2025-04-05 ASSESSMENT — ENCOUNTER SYMPTOMS: DIARRHEA: 1

## 2025-04-05 NOTE — PROGRESS NOTES
"Subjective   Patient ID: Robbie Gomez is a 60 y.o. male. They present today with a chief complaint of Diarrhea (PT HAD A CHANGE IN HIS MEDICATION , FOR 1 MONTH HE HAS HAD DIARRHEA , PCP SWITCHED HIM BACK TO HIS REGULAR MEDS BUT THE DIARRHEA HAS NOT STOPPED HE HAS TRIED MULTIPLE THINGS NOTHING IS WORKING AND PCP CAN'T GET HIM IN ANY TIME SOON ).    History of Present Illness  Denies fevers, blood in the stools or coffee grounds in the stools      Diarrhea      Past Medical History  Allergies as of 04/05/2025 - Reviewed 04/05/2025   Allergen Reaction Noted    Farxiga [dapagliflozin] Diarrhea 03/21/2025       (Not in a hospital admission)       Past Medical History:   Diagnosis Date    Calculus of kidney 07/26/2024    Calculus of ureter 07/26/2024    Derangement of left knee 07/26/2024    Diabetes mellitus (Multi)     Hyperlipidemia     Hypertension     Plantar fasciitis, left 10/27/2018       History reviewed. No pertinent surgical history.     reports that he has never smoked. He has never used smokeless tobacco. He reports that he does not drink alcohol and does not use drugs.    Review of Systems  Review of Systems   Gastrointestinal:  Positive for diarrhea.   All other systems reviewed and are negative.                                 Objective    Vitals:    04/05/25 1356   BP: 162/77   Pulse: 69   Resp: 16   Temp: 36.4 °C (97.5 °F)   TempSrc: Oral   SpO2: 100%   Weight: 83.9 kg (185 lb)   Height: 1.803 m (5' 11\")     No LMP for male patient.    Physical Exam  Vitals reviewed.   Constitutional:       Appearance: Normal appearance.   Pulmonary:      Effort: Pulmonary effort is normal.   Abdominal:      General: Abdomen is flat. Bowel sounds are normal.      Palpations: Abdomen is soft.      Tenderness: There is no abdominal tenderness.   Neurological:      Mental Status: He is alert.         Procedures    Point of Care Test & Imaging Results from this visit  No results found for this visit on 04/05/25. "   Imaging  No results found.    Cardiology, Vascular, and Other Imaging  No other imaging results found for the past 2 days      Diagnostic study results (if any) were reviewed by BEATRICE Stockton.    Assessment/Plan   Allergies, medications, history, and pertinent labs/EKGs/Imaging reviewed by BEATRICE Stockton.     Medical Decision Making  Diarrhea  Non - infectious - started after farxiga has been off since 03/22  Reviewed using imodium or peptobismal  Referred to GI for persistent diarrhea  Encouraged pt to call PCP    Orders and Diagnoses  Encounter Diagnosis   Name Primary?    Diarrhea, unspecified type Yes         Medical Admin Record      Patient disposition: Home    Electronically signed by BEATRICE Stockton  2:02 PM

## 2025-04-09 ENCOUNTER — OFFICE VISIT (OUTPATIENT)
Dept: PRIMARY CARE | Facility: CLINIC | Age: 60
End: 2025-04-09
Payer: COMMERCIAL

## 2025-04-09 VITALS
DIASTOLIC BLOOD PRESSURE: 70 MMHG | WEIGHT: 194.6 LBS | SYSTOLIC BLOOD PRESSURE: 110 MMHG | HEART RATE: 70 BPM | BODY MASS INDEX: 27.14 KG/M2 | OXYGEN SATURATION: 97 % | TEMPERATURE: 97.4 F

## 2025-04-09 DIAGNOSIS — E11.9 TYPE 2 DIABETES MELLITUS WITHOUT COMPLICATION, WITHOUT LONG-TERM CURRENT USE OF INSULIN: ICD-10-CM

## 2025-04-09 DIAGNOSIS — R19.5 LOOSE STOOLS: Primary | ICD-10-CM

## 2025-04-09 PROCEDURE — 4010F ACE/ARB THERAPY RXD/TAKEN: CPT

## 2025-04-09 PROCEDURE — 99213 OFFICE O/P EST LOW 20 MIN: CPT

## 2025-04-09 PROCEDURE — RXMED WILLOW AMBULATORY MEDICATION CHARGE

## 2025-04-09 PROCEDURE — 3078F DIAST BP <80 MM HG: CPT

## 2025-04-09 PROCEDURE — 1036F TOBACCO NON-USER: CPT

## 2025-04-09 PROCEDURE — 3074F SYST BP LT 130 MM HG: CPT

## 2025-04-09 RX ORDER — METFORMIN HYDROCHLORIDE 750 MG/1
750 TABLET, EXTENDED RELEASE ORAL
Qty: 180 TABLET | Refills: 0 | Status: SHIPPED | OUTPATIENT
Start: 2025-04-09 | End: 2025-07-09

## 2025-04-09 ASSESSMENT — PATIENT HEALTH QUESTIONNAIRE - PHQ9
1. LITTLE INTEREST OR PLEASURE IN DOING THINGS: NOT AT ALL
SUM OF ALL RESPONSES TO PHQ9 QUESTIONS 1 AND 2: 0
2. FEELING DOWN, DEPRESSED OR HOPELESS: NOT AT ALL

## 2025-04-09 ASSESSMENT — PAIN SCALES - GENERAL: PAINLEVEL_OUTOF10: 0-NO PAIN

## 2025-04-09 NOTE — PROGRESS NOTES
Subjective   Patient ID: Robbie Gomez is a 60 y.o. male who presents for Diarrhea (Since pt was switched to farxiga, pt has noted looser stools 1-2x daily.).    HPI   Robbie is seen for loose stools since he was started on Farxiga. Has been off for almost one month. No improvement until he took Imodium. Having 1 loose stool per day. Last took over the weekend. No recent travel, sick contacts. 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 Tried BRAT diet. Denies nausea, vomiting, abdominal pain, blood in stool, fever, chills.     Review of Systems  All other systems have been reviewed and are negative except as noted in the HPI.     Objective   /70 (BP Location: Left arm, Patient Position: Sitting)   Pulse 70   Temp 36.3 °C (97.4 °F) (Temporal)   Wt 88.3 kg (194 lb 9.6 oz)   SpO2 97%   BMI 27.14 kg/m²     Physical Exam  Vitals and nursing note reviewed.   Constitutional:       General: He is not in acute distress.  Eyes:      Conjunctiva/sclera: Conjunctivae normal.   Cardiovascular:      Rate and Rhythm: Normal rate.   Pulmonary:      Effort: Pulmonary effort is normal.   Abdominal:      General: Abdomen is flat. Bowel sounds are normal.      Palpations: Abdomen is soft.      Tenderness: There is no abdominal tenderness.   Musculoskeletal:      Cervical back: Neck supple.   Neurological:      General: No focal deficit present.      Mental Status: He is alert.   Psychiatric:         Mood and Affect: Mood normal.         Assessment/Plan   Assessment & Plan  Loose stools  Acute.   Low suspicion that Farxiga is the cause as patient has been off medication for about 3 weeks.   Will switch to Metformin XR 750mg BID. Risks and benefits of medication discussed and prescribed.   Recommend daily OTC fiber supplement with increase in fluid intake.   Follow up if no improvement within 1-2 weeks or sooner for worsening. Discussed referral to GI at that time.          Type 2 diabetes mellitus without complication,  without long-term current use of insulin    Orders:    metFORMIN XR (Glucophage-XR) 750 mg 24 hr tablet; Take 1 tablet (750 mg) by mouth 2 times daily (morning and late afternoon). Do not crush, chew, or split.

## 2025-04-09 NOTE — ASSESSMENT & PLAN NOTE
Orders:    metFORMIN XR (Glucophage-XR) 750 mg 24 hr tablet; Take 1 tablet (750 mg) by mouth 2 times daily (morning and late afternoon). Do not crush, chew, or split.

## 2025-04-10 ENCOUNTER — PHARMACY VISIT (OUTPATIENT)
Dept: PHARMACY | Facility: CLINIC | Age: 60
End: 2025-04-10
Payer: COMMERCIAL

## 2025-04-14 ENCOUNTER — PATIENT MESSAGE (OUTPATIENT)
Dept: PRIMARY CARE | Facility: CLINIC | Age: 60
End: 2025-04-14

## 2025-04-14 ENCOUNTER — TREATMENT (OUTPATIENT)
Dept: PHYSICAL THERAPY | Facility: CLINIC | Age: 60
End: 2025-04-14
Payer: COMMERCIAL

## 2025-04-14 DIAGNOSIS — S83.241A ACUTE MEDIAL MENISCUS TEAR OF RIGHT KNEE, INITIAL ENCOUNTER: ICD-10-CM

## 2025-04-14 DIAGNOSIS — M17.0 PRIMARY OSTEOARTHRITIS OF BOTH KNEES: ICD-10-CM

## 2025-04-14 DIAGNOSIS — S83.411A SPRAIN OF MEDIAL COLLATERAL LIGAMENT OF RIGHT KNEE, INITIAL ENCOUNTER: ICD-10-CM

## 2025-04-14 DIAGNOSIS — K21.9 GASTROESOPHAGEAL REFLUX DISEASE WITHOUT ESOPHAGITIS: ICD-10-CM

## 2025-04-14 PROCEDURE — 97110 THERAPEUTIC EXERCISES: CPT | Mod: GP

## 2025-04-14 ASSESSMENT — PAIN SCALES - GENERAL: PAINLEVEL_OUTOF10: 0 - NO PAIN

## 2025-04-14 ASSESSMENT — PAIN - FUNCTIONAL ASSESSMENT: PAIN_FUNCTIONAL_ASSESSMENT: 0-10

## 2025-04-14 NOTE — PROGRESS NOTES
"Physical Therapy Treatment    Patient Name: Robbie Gomez  MRN: [id number]  Encounter date: 4/14/2025    Time Calculation  Start Time: 1700  Stop Time: 1729  Time Calculation (min): 29 min  PT Therapeutic Procedures Time Entry  Therapeutic Exercise Time Entry: 27    Visit # 4 of 8  Visits/Dates Authorized: EVAL ONLY-Auth Rcvd 8 visits 2/27-5/11 CPTs 23115 60057 62897 69289 43713/, 20560/20561  [facility] Traditional - Auth required / $25 COPAY / $1600 OOP not met / 30V pt/ot - 0 used per Epic, check w/pt / ds 2/25/25.   CPT 67518 - DN is covered //     Current Problem:   Problem List Items Addressed This Visit             ICD-10-CM    Sprain of medial collateral ligament of right knee S83.411A    Primary osteoarthritis of both knees M17.0    Acute medial meniscus tear of right knee S83.241A     Precautions:    Diabetes       Subjective   General:    Patient states his knees have been feeling better.     Pre-Treatment Symptoms:   Pain Assessment: 0-10  0-10 (Numeric) Pain Score: 0 - No pain  Patient's Stated Pain Goal: No pain    Objective   Findings:   Tenderness in the medial joint line and medial retinaculum of R and L knee             Treatments:      Therapeutic Exercise 35672:   Nustep lv 5 x4 min   Shuttle DL 50-75# 3x10 SL 50# 2x10  HS curl DL 55-65# 2x12  B Heel raise 18# KB standing 2x15  Banded TKE black 2x15  L/R hip hinge x8 each   Single leg RDL x10 L?R  Tloop side step light around knees 15 feet x 6 L/R  Monster walk pink Tloop around ankles 15 feet x 6 L/R      Deferred:  Airdyne 5 min  Supine Active Straight Leg Raise 2x12  Sidelying Hip Abduction 1x12 R/L   Sidelying Hip Adduction 1x12 R/L   S/L clamshell blue 2x12 R/L   S/L reverse clamshell no resist 1x12 , light green 1x12 R/L   Bridge 1x5  HL hip adduction isometric 10x3\" holds , with bridge 10x3\" holds  Shuttle leg press DL # 2x12  , SL 37# 1x12 (challenging on R , some discomfort)   HEP updated and reviewed       HEP / Access Codes: " URL: [email or web address]  Access Code: [email or web address]   Date: 02/27/2025  - Sidelying Hip Abduction  - 1 x daily - 3 x weekly - 2 sets - 12 reps  - Prone Hip Extension  - 1 x daily - 3 x weekly - 2 sets - 12 reps  - Sidelying Hip Adduction  - 1 x daily - 3 x weekly - 2 sets - 12 reps  - Supine Active Straight Leg Raise  - 1 x daily - 3 x weekly - 2 sets - 12 reps  - Heel Raises with Counter Support  - 1 x daily - 3 x weekly - 2 sets - 12 reps  - Sit to Stand Without Arm Support  - 1 x daily - 3 x weekly - 2 sets - 8 reps  - Upright Bike  - 1 x daily - 3 x weekly - 1 sets - 1 reps - 5 hold  - Step Up  - 1 x daily - 3 x weekly - 2 sets - 8 reps    -monster walk  -tloop side step orange  Assessment:    Pt reporting no knee pain this visit. Pt still having knee pain at times. Pt provided updated knee strengthening exercises for home.     Post-Treatment Symptoms:   Response to Interventions: No change in pain    Plan:    Therapy options: will be seen for skilled physical therapy services  Planned modality interventions: cryotherapy, thermotherapy (hydrocollator packs) and TENS  Other planned modality interventions: light therapy, dry needling  Planned therapy interventions: manual therapy, joint mobilization, gait training, home exercise program, functional ROM exercises, flexibility, stretching, strengthening, neuromuscular re-education and balance/weight-bearing training  Frequency: 1x week  Duration in visits: 8  Duration in weeks: 8  Treatment plan discussed with: patient  Plan details: 1x week for 8 visits.    Goals:   Active       PT Problem       PT Goal 1       Start:  02/27/25       1) Pt will report pain levels no greater than 2/10 at worst with activity for less difficulty with active knee movement, standing, walking, transferring, and negotiating stairs.  2) Pt will display improved pain-free bilateral knee AROM WFL for less difficulty walking, squatting, transferring, performing bed mobility, and  negotiating stairs.  3) Pt will display improved pain-free hip and knee MMT of at least 5/5 for all major muscle groups for improved functional strength with walking, transferring, squatting, and negotiating stairs.  4) Pt will display symmetrical pain-free gait without AD and without compensation.  5) Pt will improve WOMAC to <10% impairment to indicate minimal knee dysfunction.

## 2025-04-15 RX ORDER — OMEPRAZOLE 20 MG/1
20 CAPSULE, DELAYED RELEASE ORAL
Qty: 90 CAPSULE | Refills: 1 | Status: SHIPPED | OUTPATIENT
Start: 2025-04-15 | End: 2025-10-12

## 2025-04-21 ENCOUNTER — TREATMENT (OUTPATIENT)
Dept: PHYSICAL THERAPY | Facility: CLINIC | Age: 60
End: 2025-04-21
Payer: COMMERCIAL

## 2025-04-21 DIAGNOSIS — E11.9 TYPE 2 DIABETES MELLITUS WITHOUT COMPLICATION, WITHOUT LONG-TERM CURRENT USE OF INSULIN: ICD-10-CM

## 2025-04-21 DIAGNOSIS — M17.0 PRIMARY OSTEOARTHRITIS OF BOTH KNEES: ICD-10-CM

## 2025-04-21 DIAGNOSIS — S83.241A ACUTE MEDIAL MENISCUS TEAR OF RIGHT KNEE, INITIAL ENCOUNTER: ICD-10-CM

## 2025-04-21 DIAGNOSIS — S83.411A SPRAIN OF MEDIAL COLLATERAL LIGAMENT OF RIGHT KNEE, INITIAL ENCOUNTER: ICD-10-CM

## 2025-04-21 PROCEDURE — 97110 THERAPEUTIC EXERCISES: CPT | Mod: GP

## 2025-04-21 ASSESSMENT — PAIN - FUNCTIONAL ASSESSMENT: PAIN_FUNCTIONAL_ASSESSMENT: 0-10

## 2025-04-21 ASSESSMENT — PAIN SCALES - GENERAL: PAINLEVEL_OUTOF10: 0 - NO PAIN

## 2025-04-21 NOTE — PROGRESS NOTES
"Physical Therapy Treatment    Patient Name: Robbie Gomez  MRN: [id number]  Encounter date: 4/21/2025    Time Calculation  Start Time: 1700  Stop Time: 1731  Time Calculation (min): 31 min  PT Therapeutic Procedures Time Entry  Therapeutic Exercise Time Entry: 29    Visit # 5 of 8  Visits/Dates Authorized: EVAL ONLY-Auth Rcvd 8 visits 2/27-5/11 CPTs 07715 [id number]/, [id number]/[id number]  [facility] Traditional - Auth required / $25 COPAY / $1600 OOP not met / 30V pt/ot - 0 used per Epic, check w/pt / ds 2/25/25.   CPT 58559 - DN is covered //     Current Problem:   Problem List Items Addressed This Visit           ICD-10-CM    Sprain of medial collateral ligament of right knee S83.411A    Primary osteoarthritis of both knees M17.0    Acute medial meniscus tear of right knee S83.241A     Precautions:    Diabetes       Subjective   General:    Patient states he had a busy day at work. Knees feel okay.    Pre-Treatment Symptoms:   Pain Assessment: 0-10  0-10 (Numeric) Pain Score: 0 - No pain    Objective   Findings:   Tenderness in the medial joint line and medial retinaculum of R and L knee             Treatments:      Therapeutic Exercise 52989:   Nustep lv 6 x4 min   Air squat x 10  Goblet squat x 10, 9# x 8  Banded TKE black 2x15  Heel raises, toe straight/in/out x 20 each  Knee ext dbl 25# 2x10, single L/R 25# x 8  Step ups 5 inch 2x10 L/R  Overhead plyo toss w/ squat 2x30\"  Tloop hip abd L/R x 45\" each x 2 sets    Deferred:  Airdyne 5 min  Supine Active Straight Leg Raise 2x12  Sidelying Hip Abduction 1x12 R/L   Sidelying Hip Adduction 1x12 R/L   S/L clamshell blue 2x12 R/L   S/L reverse clamshell no resist 1x12 , light green 1x12 R/L   Bridge 1x5  HL hip adduction isometric 10x3\" holds , with bridge 10x3\" holds  Shuttle leg press DL # 2x12  , SL 37# 1x12 (challenging on R , some discomfort)   HEP updated and reviewed       HEP / Access Codes: URL: [email or web address]  Access Code: " [email or web address]   Date: 02/27/2025  - Sidelying Hip Abduction  - 1 x daily - 3 x weekly - 2 sets - 12 reps  - Prone Hip Extension  - 1 x daily - 3 x weekly - 2 sets - 12 reps  - Sidelying Hip Adduction  - 1 x daily - 3 x weekly - 2 sets - 12 reps  - Supine Active Straight Leg Raise  - 1 x daily - 3 x weekly - 2 sets - 12 reps  - Heel Raises with Counter Support  - 1 x daily - 3 x weekly - 2 sets - 12 reps  - Sit to Stand Without Arm Support  - 1 x daily - 3 x weekly - 2 sets - 8 reps  - Upright Bike  - 1 x daily - 3 x weekly - 1 sets - 1 reps - 5 hold  - Step Up  - 1 x daily - 3 x weekly - 2 sets - 8 reps    -monster walk  -tloop side step orange  Assessment:    Pt going to start walking at home for exercise as well. Going to offset his walking on days he is not doing his HEP. Knees felt okay today.     Post-Treatment Symptoms:   Response to Interventions: No change in pain    Plan:    Therapy options: will be seen for skilled physical therapy services  Planned modality interventions: cryotherapy, thermotherapy (hydrocollator packs) and TENS  Other planned modality interventions: light therapy, dry needling  Planned therapy interventions: manual therapy, joint mobilization, gait training, home exercise program, functional ROM exercises, flexibility, stretching, strengthening, neuromuscular re-education and balance/weight-bearing training  Frequency: 1x week  Duration in visits: 8  Duration in weeks: 8  Treatment plan discussed with: patient  Plan details: 1x week for 8 visits.    Goals:   Active       PT Problem       PT Goal 1       Start:  02/27/25       1) Pt will report pain levels no greater than 2/10 at worst with activity for less difficulty with active knee movement, standing, walking, transferring, and negotiating stairs.  2) Pt will display improved pain-free bilateral knee AROM WFL for less difficulty walking, squatting, transferring, performing bed mobility, and negotiating stairs.  3) Pt will display improved  pain-free hip and knee MMT of at least 5/5 for all major muscle groups for improved functional strength with walking, transferring, squatting, and negotiating stairs.  4) Pt will display symmetrical pain-free gait without AD and without compensation.  5) Pt will improve WOMAC to <10% impairment to indicate minimal knee dysfunction.

## 2025-04-21 NOTE — PROGRESS NOTES
4/22/2025 CC: 60 y.o. presents for glaucoma evaluation. Referred by Martha Ge O.D    Past ocular history: glasses, TOMÁS  Family history: no family history of glaucoma   Past medical history: DM, HTN   Social history: denies tobacco     Eye medications:   Both eyes: none    BBlocker  Allergy:  As per chart     Testing:    HVF 24-2 4/22/2025: full, MD 0.6/-0.2 dB    OCT: pending    Pachymetry 4/22/2025: 508/511    Gonioscopy 4/22/2025: Narrow S/T    Tmax: unknown    Assessment:   Glaucoma suspect  - Negative fhx  - Thinner C  - Symmetrical 0.5 CDR  - TOMÁS  - Testing: HVF-wnl. OCT- pending  Target IOP: teens  -IOP 4/22/2025: 15/14.    NS cataract  - NVS    T2DM   - FU Dr Ge  - Last A1c 7.2 (2/2025)    RE  -Myopia, ast., presbyopia     Plan:   I explained my findings. GS.    Eye medications:   Both eyes: none    Return in 6 mo, OCT/gonio

## 2025-04-22 ENCOUNTER — APPOINTMENT (OUTPATIENT)
Dept: OPHTHALMOLOGY | Facility: CLINIC | Age: 60
End: 2025-04-22
Payer: COMMERCIAL

## 2025-04-22 DIAGNOSIS — H40.039 ANATOMICAL NARROW ANGLE GLAUCOMA: Primary | ICD-10-CM

## 2025-04-22 DIAGNOSIS — H40.003 GLAUCOMA SUSPECT OF BOTH EYES: ICD-10-CM

## 2025-04-22 PROCEDURE — 99204 OFFICE O/P NEW MOD 45 MIN: CPT | Performed by: OPHTHALMOLOGY

## 2025-04-22 PROCEDURE — 92020 GONIOSCOPY: CPT | Performed by: OPHTHALMOLOGY

## 2025-04-22 PROCEDURE — 76514 ECHO EXAM OF EYE THICKNESS: CPT | Performed by: OPHTHALMOLOGY

## 2025-04-22 PROCEDURE — 92083 EXTENDED VISUAL FIELD XM: CPT | Performed by: OPHTHALMOLOGY

## 2025-04-22 ASSESSMENT — GONIOSCOPY
OD_INFERIOR: PTM
OS_TEMPORAL: PTM
OS_INFERIOR: PTM
OD_SUPERIOR: ATM
OD_TEMPORAL: ATM
OS_SUPERIOR: PTM
OS_NASAL: PTM
OD_NASAL: PTM

## 2025-04-22 ASSESSMENT — EXTERNAL EXAM - LEFT EYE: OS_EXAM: NORMAL

## 2025-04-22 ASSESSMENT — VISUAL ACUITY
METHOD: SNELLEN - LINEAR
OS_SC: 20/40
OS_PH_SC: 20/25
OS_SC+: +2
OD_SC: 20/25
OD_SC+: +2

## 2025-04-22 ASSESSMENT — PACHYMETRY
OS_CT(UM): 511
OD_CT(UM): 508

## 2025-04-22 ASSESSMENT — ENCOUNTER SYMPTOMS
RESPIRATORY NEGATIVE: 0
PSYCHIATRIC NEGATIVE: 0
ENDOCRINE NEGATIVE: 0
MUSCULOSKELETAL NEGATIVE: 0
GASTROINTESTINAL NEGATIVE: 0
CONSTITUTIONAL NEGATIVE: 0
ALLERGIC/IMMUNOLOGIC NEGATIVE: 0
HEMATOLOGIC/LYMPHATIC NEGATIVE: 0
EYES NEGATIVE: 1
NEUROLOGICAL NEGATIVE: 0
CARDIOVASCULAR NEGATIVE: 0

## 2025-04-22 ASSESSMENT — SLIT LAMP EXAM - LIDS
COMMENTS: NORMAL
COMMENTS: NORMAL

## 2025-04-22 ASSESSMENT — TONOMETRY
IOP_METHOD: GOLDMANN APPLANATION
OS_IOP_MMHG: 14
OD_IOP_MMHG: 15

## 2025-04-22 ASSESSMENT — CONF VISUAL FIELD
OS_SUPERIOR_NASAL_RESTRICTION: 0
OD_NORMAL: 1
OD_SUPERIOR_TEMPORAL_RESTRICTION: 0
OD_INFERIOR_NASAL_RESTRICTION: 0
OD_SUPERIOR_NASAL_RESTRICTION: 0
OD_INFERIOR_TEMPORAL_RESTRICTION: 0
OS_SUPERIOR_TEMPORAL_RESTRICTION: 0
OS_INFERIOR_TEMPORAL_RESTRICTION: 0
OS_INFERIOR_NASAL_RESTRICTION: 0
OS_NORMAL: 1

## 2025-04-22 ASSESSMENT — CUP TO DISC RATIO
OS_RATIO: 0.5
OD_RATIO: 0.5

## 2025-04-22 ASSESSMENT — REFRACTION_MANIFEST
METHOD_AUTOREFRACTION: 1
OS_AXIS: 174
OS_SPHERE: -1.25
OS_CYLINDER: +3.00
OD_SPHERE: -1.75
OD_CYLINDER: +3.00
OD_AXIS: 001

## 2025-04-22 ASSESSMENT — EXTERNAL EXAM - RIGHT EYE: OD_EXAM: NORMAL

## 2025-04-28 ENCOUNTER — APPOINTMENT (OUTPATIENT)
Dept: PHYSICAL THERAPY | Facility: CLINIC | Age: 60
End: 2025-04-28
Payer: COMMERCIAL

## 2025-05-02 DIAGNOSIS — I10 ESSENTIAL HYPERTENSION, BENIGN: ICD-10-CM

## 2025-05-02 RX ORDER — AMLODIPINE BESYLATE 10 MG/1
10 TABLET ORAL DAILY
Qty: 90 TABLET | Refills: 0 | Status: SHIPPED | OUTPATIENT
Start: 2025-05-02

## 2025-05-04 LAB
ALBUMIN SERPL-MCNC: 4.3 G/DL (ref 3.6–5.1)
ALP SERPL-CCNC: 59 U/L (ref 35–144)
ALT SERPL-CCNC: 8 U/L (ref 9–46)
ANION GAP SERPL CALCULATED.4IONS-SCNC: 6 MMOL/L (CALC) (ref 7–17)
AST SERPL-CCNC: 18 U/L (ref 10–35)
BILIRUB SERPL-MCNC: 0.7 MG/DL (ref 0.2–1.2)
BUN SERPL-MCNC: 13 MG/DL (ref 7–25)
CALCIUM SERPL-MCNC: 9.1 MG/DL (ref 8.6–10.3)
CHLORIDE SERPL-SCNC: 103 MMOL/L (ref 98–110)
CO2 SERPL-SCNC: 30 MMOL/L (ref 20–32)
CREAT SERPL-MCNC: 0.69 MG/DL (ref 0.7–1.35)
EGFRCR SERPLBLD CKD-EPI 2021: 105 ML/MIN/1.73M2
EST. AVERAGE GLUCOSE BLD GHB EST-MCNC: 160 MG/DL
EST. AVERAGE GLUCOSE BLD GHB EST-SCNC: 8.9 MMOL/L
GLUCOSE SERPL-MCNC: 116 MG/DL (ref 65–99)
HBA1C MFR BLD: 7.2 %
POTASSIUM SERPL-SCNC: 4.5 MMOL/L (ref 3.5–5.3)
PROT SERPL-MCNC: 7.4 G/DL (ref 6.1–8.1)
SODIUM SERPL-SCNC: 139 MMOL/L (ref 135–146)

## 2025-05-07 DIAGNOSIS — E78.00 HYPERCHOLESTEREMIA: ICD-10-CM

## 2025-05-08 LAB
ALBUMIN SERPL-MCNC: 4.3 G/DL (ref 3.6–5.1)
ALP SERPL-CCNC: 59 U/L (ref 35–144)
ALT SERPL-CCNC: 8 U/L (ref 9–46)
ANION GAP SERPL CALCULATED.4IONS-SCNC: 6 MMOL/L (CALC) (ref 7–17)
AST SERPL-CCNC: 18 U/L (ref 10–35)
BILIRUB SERPL-MCNC: 0.7 MG/DL (ref 0.2–1.2)
BUN SERPL-MCNC: 13 MG/DL (ref 7–25)
CALCIUM SERPL-MCNC: 9.1 MG/DL (ref 8.6–10.3)
CHLORIDE SERPL-SCNC: 103 MMOL/L (ref 98–110)
CHOLEST SERPL-MCNC: 171 MG/DL
CHOLEST/HDLC SERPL: 3 (CALC)
CO2 SERPL-SCNC: 30 MMOL/L (ref 20–32)
CREAT SERPL-MCNC: 0.69 MG/DL (ref 0.7–1.35)
EGFRCR SERPLBLD CKD-EPI 2021: 105 ML/MIN/1.73M2
EST. AVERAGE GLUCOSE BLD GHB EST-MCNC: 160 MG/DL
EST. AVERAGE GLUCOSE BLD GHB EST-SCNC: 8.9 MMOL/L
GLUCOSE SERPL-MCNC: 116 MG/DL (ref 65–99)
HBA1C MFR BLD: 7.2 %
HDLC SERPL-MCNC: 57 MG/DL
LDLC SERPL CALC-MCNC: 100 MG/DL (CALC)
NONHDLC SERPL-MCNC: 114 MG/DL (CALC)
POTASSIUM SERPL-SCNC: 4.5 MMOL/L (ref 3.5–5.3)
PROT SERPL-MCNC: 7.4 G/DL (ref 6.1–8.1)
SODIUM SERPL-SCNC: 139 MMOL/L (ref 135–146)
TRIGL SERPL-MCNC: 58 MG/DL

## 2025-05-08 RX ORDER — SIMVASTATIN 40 MG/1
40 TABLET, FILM COATED ORAL NIGHTLY
Qty: 90 TABLET | Refills: 0 | Status: SHIPPED | OUTPATIENT
Start: 2025-05-08

## 2025-05-08 NOTE — TELEPHONE ENCOUNTER
Called [facility] and spoke with representative Kalani. Lipid panel has been added to pt's most recent blood work.

## 2025-05-14 ENCOUNTER — APPOINTMENT (OUTPATIENT)
Dept: PRIMARY CARE | Facility: CLINIC | Age: 60
End: 2025-05-14
Payer: COMMERCIAL

## 2025-05-17 DIAGNOSIS — I10 HYPERTENSION, UNSPECIFIED TYPE: ICD-10-CM

## 2025-05-19 RX ORDER — LISINOPRIL 20 MG/1
20 TABLET ORAL DAILY
Qty: 90 TABLET | Refills: 0 | Status: SHIPPED | OUTPATIENT
Start: 2025-05-19

## 2025-05-22 ENCOUNTER — TELEPHONE (OUTPATIENT)
Dept: PRIMARY CARE | Facility: CLINIC | Age: 60
End: 2025-05-22

## 2025-05-22 ENCOUNTER — APPOINTMENT (OUTPATIENT)
Dept: PRIMARY CARE | Facility: CLINIC | Age: 60
End: 2025-05-22
Payer: COMMERCIAL

## 2025-05-22 VITALS
DIASTOLIC BLOOD PRESSURE: 70 MMHG | WEIGHT: 199.2 LBS | HEART RATE: 65 BPM | BODY MASS INDEX: 27.78 KG/M2 | SYSTOLIC BLOOD PRESSURE: 110 MMHG | TEMPERATURE: 96.9 F | OXYGEN SATURATION: 98 %

## 2025-05-22 DIAGNOSIS — E11.9 TYPE 2 DIABETES MELLITUS WITHOUT COMPLICATION, WITHOUT LONG-TERM CURRENT USE OF INSULIN: ICD-10-CM

## 2025-05-22 DIAGNOSIS — I10 HYPERTENSION, UNSPECIFIED TYPE: ICD-10-CM

## 2025-05-22 PROCEDURE — 3074F SYST BP LT 130 MM HG: CPT

## 2025-05-22 PROCEDURE — 99213 OFFICE O/P EST LOW 20 MIN: CPT

## 2025-05-22 PROCEDURE — 3078F DIAST BP <80 MM HG: CPT

## 2025-05-22 PROCEDURE — RXMED WILLOW AMBULATORY MEDICATION CHARGE

## 2025-05-22 PROCEDURE — 4010F ACE/ARB THERAPY RXD/TAKEN: CPT

## 2025-05-22 RX ORDER — METFORMIN HYDROCHLORIDE 500 MG/1
1000 TABLET, EXTENDED RELEASE ORAL
Qty: 360 TABLET | Refills: 0 | Status: SHIPPED | OUTPATIENT
Start: 2025-05-22 | End: 2025-08-21

## 2025-05-22 ASSESSMENT — PAIN SCALES - GENERAL: PAINLEVEL_OUTOF10: 2

## 2025-05-22 NOTE — PROGRESS NOTES
Subjective   Patient ID: Robbie Gomez is a 60 y.o. male who presents for Follow-up (DM check).    HPI   DM: On metformin  mg twice daily, glipizide 10 mg twice daily. Diarrhea stopped completely after switching to Metformin XR. -170. Eye exam 04/25.  Denies chest pain, SOB, dizziness, lightheadedness, nausea, vomiting, hypoglycemic episodes.   Lab Results   Component Value Date    HGBA1C 7.2 (H) 05/03/2025    HGBA1C 7.2 (H) 02/08/2025    HGBA1C 6.7 (H) 08/03/2024     Lab Results   Component Value Date    CREATININE 0.69 (L) 05/03/2025    GLUCOSE 116 (H) 05/03/2025    EGFR 105 05/03/2025     BMI Readings from Last 6 Encounters:   05/22/25 27.78 kg/m²   04/09/25 27.14 kg/m²   04/05/25 25.80 kg/m²   03/21/25 27.63 kg/m²   03/06/25 27.89 kg/m²   02/14/25 29.41 kg/m²     ALBUMIN/CREATININE RATIO, RANDOM URINE   Date Value Ref Range Status   02/08/2025 7 <30 mg/g creat Final     Comment:        The ADA defines abnormalities in albumin  excretion as follows:     Albuminuria Category        Result (mg/g creatinine)     Normal to Mildly increased   <30  Moderately increased            Severely increased           > OR = 300     The ADA recommends that at least two of three  specimens collected within a 3-6 month period be  abnormal before considering a patient to be  within a diagnostic category.       Albumin/Creatinine Ratio   Date Value Ref Range Status   01/06/2024   Final     Comment:     One or more analytes used in this calculation is outside of the analytical measurement range. Calculation cannot be performed.     Albumin/Creatine Ratio   Date Value Ref Range Status   06/24/2023 9.2 0 - 30 MG/G Final     Comment:     30 to 300 mg/g indicates an increased risk for diabetic nephropathy.   Greater than 300 mg/g is consistent with clinical nephropathy. (Am J   Kidney Disease 1995, 25:107)       Review of Systems  All other systems have been reviewed and are negative except as noted in the HPI.      Objective   /70   Pulse 65   Temp 36.1 °C (96.9 °F) (Temporal)   Wt 90.4 kg (199 lb 3.2 oz)   SpO2 98%   BMI 27.78 kg/m²     Physical Exam  Vitals and nursing note reviewed.   Constitutional:       General: He is not in acute distress.  Eyes:      Extraocular Movements: Extraocular movements intact.      Conjunctiva/sclera: Conjunctivae normal.   Neck:      Vascular: No carotid bruit.   Cardiovascular:      Rate and Rhythm: Normal rate and regular rhythm.   Pulmonary:      Effort: Pulmonary effort is normal.      Breath sounds: Normal breath sounds.   Musculoskeletal:      Cervical back: Neck supple.      Right lower leg: No edema.      Left lower leg: No edema.   Neurological:      General: No focal deficit present.      Mental Status: He is alert.   Psychiatric:         Mood and Affect: Mood normal.         Assessment/Plan   Assessment & Plan  Type 2 diabetes mellitus without complication, without long-term current use of insulin  Chronic.  We discussed restarting Farxiga as I have a low suspicion medication was causing diarrhea as symptoms completely resolved after switching to metformin XR.  Patient states that he is hesitant to switch medications at this time.  Will increase metformin XR to 1000 mg twice daily at this time.  Continue glipizide 10 mg twice daily.  Low carbohydrate diet and increase in activity. Recheck in 1 month with Melina Olguin RPH.  Recheck with me in 3 months or sooner if needed.     Orders:    metFORMIN XR (Glucophage-XR) 500 mg 24 hr tablet; Take 2 tablets (1,000 mg) by mouth 2 times a day before meals. Do not crush, chew, or split.

## 2025-05-22 NOTE — ASSESSMENT & PLAN NOTE
Chronic.  We discussed restarting Farxiga as I have a low suspicion medication was causing diarrhea as symptoms completely resolved after switching to metformin XR.  Patient states that he is hesitant to switch medications at this time.  Will increase metformin XR to 1000 mg twice daily at this time.  Continue glipizide 10 mg twice daily.  Low carbohydrate diet and increase in activity. Recheck in 1 month with Melina Olguin RPH.  Recheck with me in 3 months or sooner if needed.     Orders:    metFORMIN XR (Glucophage-XR) 500 mg 24 hr tablet; Take 2 tablets (1,000 mg) by mouth 2 times a day before meals. Do not crush, chew, or split.

## 2025-05-23 ENCOUNTER — PHARMACY VISIT (OUTPATIENT)
Dept: PHARMACY | Facility: CLINIC | Age: 60
End: 2025-05-23
Payer: COMMERCIAL

## 2025-05-23 RX ORDER — ATENOLOL 50 MG/1
50 TABLET ORAL 2 TIMES DAILY
Qty: 180 TABLET | Refills: 0 | Status: SHIPPED | OUTPATIENT
Start: 2025-05-23

## 2025-06-18 ENCOUNTER — APPOINTMENT (OUTPATIENT)
Dept: PRIMARY CARE | Facility: CLINIC | Age: 60
End: 2025-06-18
Payer: COMMERCIAL

## 2025-06-18 VITALS — HEIGHT: 71 IN | WEIGHT: 195.5 LBS | BODY MASS INDEX: 27.37 KG/M2

## 2025-06-18 DIAGNOSIS — E11.9 TYPE 2 DIABETES MELLITUS WITHOUT COMPLICATION, WITHOUT LONG-TERM CURRENT USE OF INSULIN: ICD-10-CM

## 2025-06-18 RX ORDER — METFORMIN HYDROCHLORIDE 500 MG/1
1000 TABLET, EXTENDED RELEASE ORAL
Qty: 360 TABLET | Refills: 1 | Status: SHIPPED | OUTPATIENT
Start: 2025-06-18 | End: 2025-12-15

## 2025-06-18 NOTE — PROGRESS NOTES
DM FOLLOW UP  E11.9 - Type 2 diabetes    Robbie Gomez is a 60 y.o. male here today at the request of Referring Provider: Daysi Estrada PA-C for my opinion regarding diabetes management.  My final recommendations will be communicated back to the requesting provider by way of shared medical record.     Patient is approved for VBID    Subjective   Past Medical History:  He has a past medical history of Calculus of kidney (07/26/2024), Calculus of ureter (07/26/2024), Derangement of left knee (07/26/2024), Diabetes mellitus (Multi), Hyperlipidemia, Hypertension, and Plantar fasciitis, left (10/27/2018).    Social History:  He reports that he has never smoked. He has never used smokeless tobacco. He reports that he does not drink alcohol and does not use drugs.    Allergies:  Farxiga [dapagliflozin]    CURRENT PHARMACOTHERAPY   Current Outpatient Medications   Medication Instructions    amLODIPine (NORVASC) 10 mg, oral, Daily    aspirin 81 mg, Daily    atenolol (TENORMIN) 50 mg, oral, 2 times daily    blood sugar diagnostic (Contour Next Test Strips) strip Check blood sugar once daily in the morning.    blood-glucose meter (Accu-Chek Guide Glucose Meter) misc Use to check blood sugar once daily    glipiZIDE (GLUCOTROL) 10 mg, oral, 2 times daily before meals    lancets 33 gauge misc Test blood sugar once daily in the morning    lisinopril 20 mg, oral, Daily    meloxicam (MOBIC) 15 mg, oral, Daily    metFORMIN XR (GLUCOPHAGE-XR) 1,000 mg, oral, 2 times daily before meals, Do not crush, chew, or split.    omeprazole (PRILOSEC) 20 mg, oral, Daily before breakfast    simvastatin (ZOCOR) 40 mg, oral, Nightly     Pt denies SE/intolerances  Reviewed all medications by prescribing practitioner (such as prescriptions, OTCs, herbal therapies, supplements) and documented in the medical record.     Reviewed need for secondary prevention: Statins, ACE-I/ARB, Aspirin    Glucose Monitoring  Patient is checking glucose 1 time  per day.  Recent FBS:  100's to 120's.  Average glucose = 126 on one touch verio meter.      *pt report one instance of hypoglycemia (63) that was resolved with something to eat.  He states that he did not have much to eat on the day that the hypo event occurred.  Reviewed treatment for low blood sugar.      Last Labs/Vitals/Meds  HEMOGLOBIN A1c   Date Value   05/03/2025 7.2 % (H)   02/08/2025 7.2 % of total Hgb (H)     Hemoglobin A1C (%)   Date Value   08/03/2024 6.7 (H)   01/06/2024 6.6 (H)   06/24/2023 6.5 (H)   09/10/2022 6.7 (H)   03/19/2022 6.9 (H)   11/13/2021 7.0 (H)   06/19/2021 7.2 (H)   02/20/2021 6.9 (H)     GLUCOSE (mg/dL)   Date Value   05/03/2025 116 (H)     CREATININE (mg/dL)   Date Value   05/03/2025 0.69 (L)     EGFR (mL/min/1.73m2)   Date Value   05/03/2025 105       BP Readings from Last 3 Encounters:   05/22/25 110/70   04/09/25 110/70   04/05/25 162/77        Wt Readings from Last 6 Encounters:   05/22/25 90.4 kg (199 lb 3.2 oz)   04/09/25 88.3 kg (194 lb 9.6 oz)   04/05/25 83.9 kg (185 lb)   03/21/25 89.8 kg (198 lb)   03/06/25 90.7 kg (200 lb)   02/14/25 93 kg (205 lb)     BMI Readings from Last 6 Encounters:   05/22/25 27.78 kg/m²   04/09/25 27.14 kg/m²   04/05/25 25.80 kg/m²   03/21/25 27.63 kg/m²   03/06/25 27.89 kg/m²   02/14/25 29.41 kg/m²       Assessment:  Patients diabetes is reasonably well controlled with most recent A1c of 7.2% (Goal < 7%).    Compliance at present is estimated to be good  Discussed continue all medications at current dosages.    Can consider change glipizide to DPP4 or GLP-1 at next OV if A1c is not improved.        Plan:  1.  Continue all medications at current dosages  2.  Follow up with PCP as previously scheduled   3.  Follow up in 6 months with clinical pharmacist       Data reviewed and evaluated by URSZULA Olguin RPH, ROSA M  Treatment and plan changes discussed with Daysi Estrada PA-C

## 2025-06-23 PROCEDURE — RXMED WILLOW AMBULATORY MEDICATION CHARGE

## 2025-06-24 ENCOUNTER — PHARMACY VISIT (OUTPATIENT)
Dept: PHARMACY | Facility: CLINIC | Age: 60
End: 2025-06-24
Payer: COMMERCIAL

## 2025-07-18 DIAGNOSIS — I10 HYPERTENSION, UNSPECIFIED TYPE: ICD-10-CM

## 2025-07-18 DIAGNOSIS — E11.9 TYPE 2 DIABETES MELLITUS WITHOUT COMPLICATION, WITHOUT LONG-TERM CURRENT USE OF INSULIN: ICD-10-CM

## 2025-07-21 ENCOUNTER — APPOINTMENT (OUTPATIENT)
Dept: RADIOLOGY | Facility: HOSPITAL | Age: 60
End: 2025-07-21
Payer: COMMERCIAL

## 2025-07-21 ENCOUNTER — HOSPITAL ENCOUNTER (EMERGENCY)
Facility: HOSPITAL | Age: 60
Discharge: HOME | End: 2025-07-21
Payer: COMMERCIAL

## 2025-07-21 ENCOUNTER — APPOINTMENT (OUTPATIENT)
Dept: CARDIOLOGY | Facility: HOSPITAL | Age: 60
End: 2025-07-21
Payer: COMMERCIAL

## 2025-07-21 VITALS
WEIGHT: 191.8 LBS | TEMPERATURE: 98.1 F | SYSTOLIC BLOOD PRESSURE: 150 MMHG | BODY MASS INDEX: 26.85 KG/M2 | RESPIRATION RATE: 16 BRPM | HEART RATE: 59 BPM | DIASTOLIC BLOOD PRESSURE: 78 MMHG | OXYGEN SATURATION: 98 % | HEIGHT: 71 IN

## 2025-07-21 DIAGNOSIS — R55 NEAR SYNCOPE: ICD-10-CM

## 2025-07-21 DIAGNOSIS — E83.42 HYPOMAGNESEMIA: Primary | ICD-10-CM

## 2025-07-21 LAB
ALBUMIN SERPL BCP-MCNC: 4.4 G/DL (ref 3.4–5)
ALP SERPL-CCNC: 62 U/L (ref 33–136)
ALT SERPL W P-5'-P-CCNC: 11 U/L (ref 10–52)
ANION GAP SERPL CALCULATED.3IONS-SCNC: 9 MMOL/L (ref 10–20)
APPEARANCE UR: CLEAR
AST SERPL W P-5'-P-CCNC: 21 U/L (ref 9–39)
ATRIAL RATE: 61 BPM
BASOPHILS # BLD AUTO: 0.09 X10*3/UL (ref 0–0.1)
BASOPHILS NFR BLD AUTO: 1.6 %
BILIRUB SERPL-MCNC: 0.6 MG/DL (ref 0–1.2)
BILIRUB UR STRIP.AUTO-MCNC: NEGATIVE MG/DL
BUN SERPL-MCNC: 13 MG/DL (ref 6–23)
CALCIUM SERPL-MCNC: 9 MG/DL (ref 8.6–10.3)
CARDIAC TROPONIN I PNL SERPL HS: 4 NG/L (ref 0–20)
CARDIAC TROPONIN I PNL SERPL HS: 5 NG/L (ref 0–20)
CHLORIDE SERPL-SCNC: 102 MMOL/L (ref 98–107)
CO2 SERPL-SCNC: 30 MMOL/L (ref 21–32)
COLOR UR: ABNORMAL
CREAT SERPL-MCNC: 0.68 MG/DL (ref 0.5–1.3)
EGFRCR SERPLBLD CKD-EPI 2021: >90 ML/MIN/1.73M*2
EOSINOPHIL # BLD AUTO: 0.16 X10*3/UL (ref 0–0.7)
EOSINOPHIL NFR BLD AUTO: 2.9 %
ERYTHROCYTE [DISTWIDTH] IN BLOOD BY AUTOMATED COUNT: 12.5 % (ref 11.5–14.5)
GLUCOSE BLD MANUAL STRIP-MCNC: 127 MG/DL (ref 74–99)
GLUCOSE SERPL-MCNC: 125 MG/DL (ref 74–99)
GLUCOSE UR STRIP.AUTO-MCNC: NORMAL MG/DL
HCT VFR BLD AUTO: 40.7 % (ref 41–52)
HGB BLD-MCNC: 13.2 G/DL (ref 13.5–17.5)
IMM GRANULOCYTES # BLD AUTO: 0.02 X10*3/UL (ref 0–0.7)
IMM GRANULOCYTES NFR BLD AUTO: 0.4 % (ref 0–0.9)
KETONES UR STRIP.AUTO-MCNC: NEGATIVE MG/DL
LEUKOCYTE ESTERASE UR QL STRIP.AUTO: NEGATIVE
LYMPHOCYTES # BLD AUTO: 1.67 X10*3/UL (ref 1.2–4.8)
LYMPHOCYTES NFR BLD AUTO: 29.8 %
MAGNESIUM SERPL-MCNC: 1.26 MG/DL (ref 1.6–2.4)
MCH RBC QN AUTO: 28.9 PG (ref 26–34)
MCHC RBC AUTO-ENTMCNC: 32.4 G/DL (ref 32–36)
MCV RBC AUTO: 89 FL (ref 80–100)
MONOCYTES # BLD AUTO: 0.53 X10*3/UL (ref 0.1–1)
MONOCYTES NFR BLD AUTO: 9.4 %
NEUTROPHILS # BLD AUTO: 3.14 X10*3/UL (ref 1.2–7.7)
NEUTROPHILS NFR BLD AUTO: 55.9 %
NITRITE UR QL STRIP.AUTO: NEGATIVE
NRBC BLD-RTO: 0 /100 WBCS (ref 0–0)
P AXIS: 44 DEGREES
P OFFSET: 192 MS
P ONSET: 133 MS
PH UR STRIP.AUTO: 8 [PH]
PLATELET # BLD AUTO: 178 X10*3/UL (ref 150–450)
POTASSIUM SERPL-SCNC: 4.3 MMOL/L (ref 3.5–5.3)
PR INTERVAL: 178 MS
PROT SERPL-MCNC: 7.6 G/DL (ref 6.4–8.2)
PROT UR STRIP.AUTO-MCNC: NEGATIVE MG/DL
Q ONSET: 222 MS
QRS COUNT: 10 BEATS
QRS DURATION: 94 MS
QT INTERVAL: 410 MS
QTC CALCULATION(BAZETT): 412 MS
QTC FREDERICIA: 412 MS
R AXIS: 42 DEGREES
RBC # BLD AUTO: 4.57 X10*6/UL (ref 4.5–5.9)
RBC # UR STRIP.AUTO: NEGATIVE MG/DL
SODIUM SERPL-SCNC: 137 MMOL/L (ref 136–145)
SP GR UR STRIP.AUTO: 1.01
T AXIS: -14 DEGREES
T OFFSET: 427 MS
UROBILINOGEN UR STRIP.AUTO-MCNC: ABNORMAL MG/DL
VENTRICULAR RATE: 61 BPM
WBC # BLD AUTO: 5.6 X10*3/UL (ref 4.4–11.3)

## 2025-07-21 PROCEDURE — 71045 X-RAY EXAM CHEST 1 VIEW: CPT | Mod: FOREIGN READ | Performed by: RADIOLOGY

## 2025-07-21 PROCEDURE — 85025 COMPLETE CBC W/AUTO DIFF WBC: CPT

## 2025-07-21 PROCEDURE — 96361 HYDRATE IV INFUSION ADD-ON: CPT

## 2025-07-21 PROCEDURE — 70450 CT HEAD/BRAIN W/O DYE: CPT | Performed by: RADIOLOGY

## 2025-07-21 PROCEDURE — 70450 CT HEAD/BRAIN W/O DYE: CPT

## 2025-07-21 PROCEDURE — 84484 ASSAY OF TROPONIN QUANT: CPT

## 2025-07-21 PROCEDURE — 36415 COLL VENOUS BLD VENIPUNCTURE: CPT

## 2025-07-21 PROCEDURE — 82947 ASSAY GLUCOSE BLOOD QUANT: CPT

## 2025-07-21 PROCEDURE — 99285 EMERGENCY DEPT VISIT HI MDM: CPT | Mod: 25

## 2025-07-21 PROCEDURE — 71045 X-RAY EXAM CHEST 1 VIEW: CPT

## 2025-07-21 PROCEDURE — 2500000004 HC RX 250 GENERAL PHARMACY W/ HCPCS (ALT 636 FOR OP/ED)

## 2025-07-21 PROCEDURE — 93005 ELECTROCARDIOGRAM TRACING: CPT

## 2025-07-21 PROCEDURE — 96366 THER/PROPH/DIAG IV INF ADDON: CPT

## 2025-07-21 PROCEDURE — 96365 THER/PROPH/DIAG IV INF INIT: CPT

## 2025-07-21 PROCEDURE — 81003 URINALYSIS AUTO W/O SCOPE: CPT

## 2025-07-21 PROCEDURE — 84075 ASSAY ALKALINE PHOSPHATASE: CPT

## 2025-07-21 PROCEDURE — 83735 ASSAY OF MAGNESIUM: CPT

## 2025-07-21 RX ORDER — MAGNESIUM SULFATE HEPTAHYDRATE 40 MG/ML
2 INJECTION, SOLUTION INTRAVENOUS ONCE
Status: COMPLETED | OUTPATIENT
Start: 2025-07-21 | End: 2025-07-21

## 2025-07-21 RX ADMIN — SODIUM CHLORIDE 500 ML: 900 INJECTION, SOLUTION INTRAVENOUS at 09:45

## 2025-07-21 RX ADMIN — MAGNESIUM SULFATE HEPTAHYDRATE 2 G: 40 INJECTION, SOLUTION INTRAVENOUS at 10:45

## 2025-07-21 ASSESSMENT — PAIN SCALES - GENERAL
PAINLEVEL_OUTOF10: 0 - NO PAIN
PAINLEVEL_OUTOF10: 0 - NO PAIN

## 2025-07-21 ASSESSMENT — PAIN - FUNCTIONAL ASSESSMENT: PAIN_FUNCTIONAL_ASSESSMENT: 0-10

## 2025-07-21 NOTE — ED PROVIDER NOTES
HPI   Chief Complaint   Patient presents with    Dizziness     States he was at work, had suddenly felt dizzy and fell backwards. Denies head strike or blood thinner use. Unknown loc. No cardiac hx. Alert and oriented X 4. Hx of htn and DM T2       Patient is a 60-year-old male with a history of hypertension and type 2 diabetes presenting to the emergency department for evaluation after a near syncopal episode.  Patient states he was reaching for something and suddenly felt lightheaded and fell to the ground.  He states he does not think he hit his head and he does not think he fully lost consciousness.  He states the lightheaded/dizziness episode lasted only a few seconds.  He states he now feels off and not like himself.  He states he did not eat this morning.  Denies any chest pain, shortness of breath, fevers, chills, nausea, vomiting, abdominal pain.  Denies any recent travel or sick contacts.  Denies any headaches or changes in vision.  Denies any history of ACS/CAD.  Denies any history of CVA/TIA.              Patient History   Medical History[1]  Surgical History[2]  Family History[3]  Social History[4]    Physical Exam   ED Triage Vitals [07/21/25 0918]   Temperature Heart Rate Respirations BP   36.7 °C (98.1 °F) 64 15 162/84      Pulse Ox Temp src Heart Rate Source Patient Position   99 % -- Monitor Sitting      BP Location FiO2 (%)     Right arm --       Physical Exam  Vitals and nursing note reviewed.   Constitutional:       General: He is not in acute distress.     Appearance: Normal appearance. He is not ill-appearing or toxic-appearing.   HENT:      Head: Normocephalic and atraumatic.      Nose: Nose normal.      Mouth/Throat:      Mouth: Mucous membranes are moist.     Eyes:      Extraocular Movements: Extraocular movements intact.      Pupils: Pupils are equal, round, and reactive to light.       Cardiovascular:      Rate and Rhythm: Normal rate and regular rhythm.      Pulses: Normal pulses.    Pulmonary:      Effort: Pulmonary effort is normal.      Breath sounds: Normal breath sounds.   Abdominal:      Palpations: Abdomen is soft.      Tenderness: There is no abdominal tenderness.     Musculoskeletal:         General: Normal range of motion.      Cervical back: Normal range of motion.     Skin:     General: Skin is warm and dry.     Neurological:      General: No focal deficit present.      Mental Status: He is alert and oriented to person, place, and time.      Sensory: No sensory deficit.      Motor: No weakness.     Psychiatric:         Mood and Affect: Mood normal.         Behavior: Behavior normal.           ED Course & MDM   ED Course as of 07/21/25 1217   Mon Jul 21, 2025   0930 EKG obtained at 924 interpreted by myself shows sinus rhythm ventricular rate 60 good R wave progression T wave inversions lead III and aVF [JW]      ED Course User Index  [JW] Leonel Green MD         Diagnoses as of 07/21/25 1217   Hypomagnesemia   Near syncope                 No data recorded     [place] Coma Scale Score: 15 (07/21/25 0932 : Rosa Davis RN)       NIH Stroke Scale: 0 (07/21/25 0936 : Josie Mora PA-C)                   Medical Decision Making  **Disclaimer parts of this chart have been completed using voice recognition software. Please excuse any errors of transcription.     Evaluated this patient independently and my supervising physician was available for consultation.    HPI: Detailed above.    Exam: A medically appropriate exam performed, outlined above, given the known history and presentation.    History obtained from: Patient    EKG: Reviewed by myself.  Reviewed and interpreted by my attending physician.    Labs/Diagnostics:  Labs Reviewed   CBC WITH AUTO DIFFERENTIAL - Abnormal       Result Value    WBC 5.6      nRBC 0.0      RBC 4.57      Hemoglobin 13.2 (*)     Hematocrit 40.7 (*)     MCV 89      MCH 28.9      MCHC 32.4      RDW 12.5      Platelets 178      Neutrophils % 55.9       Immature Granulocytes %, Automated 0.4      Lymphocytes % 29.8      Monocytes % 9.4      Eosinophils % 2.9      Basophils % 1.6      Neutrophils Absolute 3.14      Immature Granulocytes Absolute, Automated 0.02      Lymphocytes Absolute 1.67      Monocytes Absolute 0.53      Eosinophils Absolute 0.16      Basophils Absolute 0.09     COMPREHENSIVE METABOLIC PANEL - Abnormal    Glucose 125 (*)     Sodium 137      Potassium 4.3      Chloride 102      Bicarbonate 30      Anion Gap 9 (*)     Urea Nitrogen 13      Creatinine 0.68      eGFR >90      Calcium 9.0      Albumin 4.4      Alkaline Phosphatase 62      Total Protein 7.6      AST 21      Bilirubin, Total 0.6      ALT 11     MAGNESIUM - Abnormal    Magnesium 1.26 (*)    URINALYSIS WITH REFLEX CULTURE AND MICROSCOPIC - Abnormal    Color, Urine Light-Yellow      Appearance, Urine Clear      Specific Gravity, Urine 1.014      pH, Urine 8.0      Protein, Urine NEGATIVE      Glucose, Urine Normal      Blood, Urine NEGATIVE      Ketones, Urine NEGATIVE      Bilirubin, Urine NEGATIVE      Urobilinogen, Urine 2 (1+) (*)     Nitrite, Urine NEGATIVE      Leukocyte Esterase, Urine NEGATIVE     POCT GLUCOSE - Abnormal    POCT Glucose 127 (*)    SERIAL TROPONIN-INITIAL - Normal    Troponin I, High Sensitivity 5      Narrative:     Less than 99th percentile of normal range cutoff-  Female and children under 18 years old <14 ng/L; Male <21 ng/L: Negative  Repeat testing should be performed if clinically indicated.     Female and children under 18 years old 14-50 ng/L; Male 21-50 ng/L:  Consistent with possible cardiac damage and possible increased clinical   risk. Serial measurements may help to assess extent of myocardial damage.     >50 ng/L: Consistent with cardiac damage, increased clinical risk and  myocardial infarction. Serial measurements may help assess extent of   myocardial damage.      NOTE: Children less than 1 year old may have higher baseline troponin   levels  and results should be interpreted in conjunction with the overall   clinical context.     NOTE: Troponin I testing is performed using a different   testing methodology at [facility] than at other   [facility]. Direct result comparisons should only   be made within the same method.   SERIAL TROPONIN, 1 HOUR - Normal    Troponin I, High Sensitivity 4      Narrative:     Less than 99th percentile of normal range cutoff-  Female and children under 18 years old <14 ng/L; Male <21 ng/L: Negative  Repeat testing should be performed if clinically indicated.     Female and children under 18 years old 14-50 ng/L; Male 21-50 ng/L:  Consistent with possible cardiac damage and possible increased clinical   risk. Serial measurements may help to assess extent of myocardial damage.     >50 ng/L: Consistent with cardiac damage, increased clinical risk and  myocardial infarction. Serial measurements may help assess extent of   myocardial damage.      NOTE: Children less than 1 year old may have higher baseline troponin   levels and results should be interpreted in conjunction with the overall   clinical context.     NOTE: Troponin I testing is performed using a different   testing methodology at [facility] than at other   [facility]. Direct result comparisons should only   be made within the same method.   TROPONIN SERIES- (INITIAL, 1 HR)    Narrative:     The following orders were created for panel order Troponin I Series, High Sensitivity (0, 1 HR).  Procedure                               Abnormality         Status                     ---------                               -----------         ------                     Troponin I, High Sensiti...[[id number]]  Normal              Final result               Troponin, High Sensitivi...[[id number]]  Normal              Final result                 Please view results for these tests on the individual orders.   URINALYSIS WITH REFLEX CULTURE AND  MICROSCOPIC    Narrative:     The following orders were created for panel order Urinalysis with Reflex Culture and Microscopic.  Procedure                               Abnormality         Status                     ---------                               -----------         ------                     Urinalysis with Reflex C...[[id number]]  Abnormal            Final result               Extra Urine Gray Tube[[id number]]                                                         Please view results for these tests on the individual orders.   EXTRA URINE GRAY TUBE     XR chest 1 view   Final Result   No acute process.   Signed by Cayetano Lea MD      CT head wo IV contrast   Final Result   Normal unenhanced CT brain.        1.4 cm polyp left sphenoid sinus.        MACRO:   None             Signed by: Vesta Michaud 7/21/2025 11:05 AM   Dictation workstation:   [id number]        EMERGENCY DEPARTMENT COURSE and DIFFERENTIAL DIAGNOSIS/MDM:  Patient is a 60-year-old male presenting to the emergency department for evaluation of near syncopal episode.  On physical exam vital signs remarkable for hypertension but otherwise stable and patient is in no acute distress.  NIH 0.  Physical exam benign.  Diagnostic labs ordered as well as chest x-ray and CT of the head.  Initial troponin 5 and repeat troponin 4 therefore low sufficient for ACS.  CMP showed no electrolyte abnormalities.  Magnesium 1.26 therefore 2 g of IV mag ordered.  Urine showed no evidence of infection.  CBC showed no leukocytosis with mild anemia but no transfusion needed.  Chest x-ray showed no acute process with no pneumonia, pneumothorax, cardiomegaly.  CT of the head showed a polyp of the left sphenoid sinus but otherwise no acute abnormalities in the brain.  Patient ambulated without difficulty and is feeling better compared to when he first came to the emergency department.  Suspect symptoms likely related to a vasovagal syncope or symptoms due to  "hypomagnesemia.  Patient states he does take magnesium supplements at home.  He was discharged in stable condition advised to follow-up with primary care physician outpatient for further management of his symptoms and for further check of his magnesium levels.  Patient voiced understanding.  He was discharged in stable condition and will return to the emergency department with any new or worsening symptoms.    The patient presented with a chief complaint of near syncope. The differential diagnosis associated with this patient's presentation includes vasovagal, electrolyte normalities, dehydration, arrhythmia, ACS, CVA/TIA.     Vitals:    Vitals:    07/21/25 0918 07/21/25 1028 07/21/25 1204   BP: 162/84 150/74 150/78   BP Location: Right arm Right arm Right arm   Patient Position: Sitting     Pulse: 64 62 59   Resp: 15 16 16   Temp: 36.7 °C (98.1 °F)     SpO2: 99% 99% 98%   Weight: 87 kg (191 lb 12.8 oz)     Height: 1.803 m (5' 11\")         ED Course as of 07/21/25 1217   Mon Jul 21, 2025   0930 EKG obtained at 924 interpreted by myself shows sinus rhythm ventricular rate 60 good R wave progression T wave inversions lead III and aVF [JW]      ED Course User Index  [JW] Leonel Green MD         Diagnoses as of 07/21/25 1217   Hypomagnesemia   Near syncope       History Limited by:    None    Independent history obtained from:    None    External records reviewed:    Outpatient Note primary care physician note from 5/22/2025 to determine patient's past medical history including type 2 diabetes    Diagnostics interpreted by me:    CT Scan(s) see MDM and Xrays - see my independent interpretation in MDM    Discussions with other clinicians:    None    Chronic conditions impacting care:    Diabetes and Hypertension    Social determinants of health affecting care:    None    Diagnostic tests considered but not performed: None    ED Medications managed:    Medications   magnesium sulfate 2 g in sterile water for " injection 50 mL (2 g intravenous New Bag 7/21/25 1045)   sodium chloride 0.9 % bolus 500 mL (0 mL intravenous Stopped 7/21/25 1049)       Prescription drugs considered:    None    Screenings:  NIH Stroke Scale: 0             Procedure  Procedures         [1]   Past Medical History:  Diagnosis Date    Calculus of kidney 07/26/2024    Calculus of ureter 07/26/2024    Derangement of left knee 07/26/2024    Diabetes mellitus (Multi)     Hyperlipidemia     Hypertension     Plantar fasciitis, left 10/27/2018   [2] No past surgical history on file.  [3]   Family History  Problem Relation Name Age of Onset    Hyperlipidemia Mother      Diabetes Mother      Hyperlipidemia Father      Diabetes Father     [4]   Social History  Tobacco Use    Smoking status: Never    Smokeless tobacco: Never   Vaping Use    Vaping status: Never Used   Substance Use Topics    Alcohol use: Never    Drug use: Never        Josie Mora PA-C  07/21/25 121

## 2025-07-21 NOTE — DISCHARGE INSTRUCTIONS
Thank you for choosing [facility] and [facility] [place], [place] for your care today. Please follow up as indicated as continued care and treatment is a very important part of your care today. Please return to this or any Emergency Department if you have any new or worsening symptoms.

## 2025-07-21 NOTE — Clinical Note
Robbie Gomez was seen and treated in our emergency department on 7/21/2025.  He may return to work on 07/22/2025.       If you have any questions or concerns, please don't hesitate to call.      Josie Mora PA-C

## 2025-07-26 DIAGNOSIS — E78.00 HYPERCHOLESTEREMIA: ICD-10-CM

## 2025-07-28 RX ORDER — SIMVASTATIN 40 MG/1
40 TABLET, FILM COATED ORAL NIGHTLY
Qty: 90 TABLET | Refills: 0 | Status: SHIPPED | OUTPATIENT
Start: 2025-07-28

## 2025-07-30 DIAGNOSIS — I10 ESSENTIAL HYPERTENSION, BENIGN: ICD-10-CM

## 2025-07-30 RX ORDER — AMLODIPINE BESYLATE 10 MG/1
10 TABLET ORAL DAILY
Qty: 90 TABLET | Refills: 0 | Status: SHIPPED | OUTPATIENT
Start: 2025-07-30

## 2025-08-09 LAB
ALBUMIN SERPL-MCNC: 4.4 G/DL (ref 3.6–5.1)
ALP SERPL-CCNC: 52 U/L (ref 35–144)
ALT SERPL-CCNC: 10 U/L (ref 9–46)
ANION GAP SERPL CALCULATED.4IONS-SCNC: 8 MMOL/L (CALC) (ref 7–17)
AST SERPL-CCNC: 18 U/L (ref 10–35)
BILIRUB SERPL-MCNC: 0.7 MG/DL (ref 0.2–1.2)
BUN SERPL-MCNC: 13 MG/DL (ref 7–25)
CALCIUM SERPL-MCNC: 8.9 MG/DL (ref 8.6–10.3)
CHLORIDE SERPL-SCNC: 102 MMOL/L (ref 98–110)
CO2 SERPL-SCNC: 29 MMOL/L (ref 20–32)
CREAT SERPL-MCNC: 0.72 MG/DL (ref 0.7–1.35)
EGFRCR SERPLBLD CKD-EPI 2021: 104 ML/MIN/1.73M2
EST. AVERAGE GLUCOSE BLD GHB EST-MCNC: 126 MG/DL
EST. AVERAGE GLUCOSE BLD GHB EST-SCNC: 7 MMOL/L
GLUCOSE SERPL-MCNC: 96 MG/DL (ref 65–99)
HBA1C MFR BLD: 6 %
POTASSIUM SERPL-SCNC: 4.6 MMOL/L (ref 3.5–5.3)
PROT SERPL-MCNC: 7.5 G/DL (ref 6.1–8.1)
SODIUM SERPL-SCNC: 139 MMOL/L (ref 135–146)

## 2025-08-11 ENCOUNTER — PHARMACY VISIT (OUTPATIENT)
Dept: PHARMACY | Facility: CLINIC | Age: 60
End: 2025-08-11
Payer: COMMERCIAL

## 2025-08-11 PROCEDURE — RXMED WILLOW AMBULATORY MEDICATION CHARGE

## 2025-08-15 DIAGNOSIS — I10 HYPERTENSION, UNSPECIFIED TYPE: ICD-10-CM

## 2025-08-18 ENCOUNTER — APPOINTMENT (OUTPATIENT)
Dept: PRIMARY CARE | Facility: CLINIC | Age: 60
End: 2025-08-18
Payer: COMMERCIAL

## 2025-08-18 ENCOUNTER — TELEPHONE (OUTPATIENT)
Dept: PRIMARY CARE | Facility: CLINIC | Age: 60
End: 2025-08-18

## 2025-08-18 VITALS
DIASTOLIC BLOOD PRESSURE: 70 MMHG | WEIGHT: 194.6 LBS | SYSTOLIC BLOOD PRESSURE: 126 MMHG | BODY MASS INDEX: 27.14 KG/M2 | TEMPERATURE: 98.1 F | OXYGEN SATURATION: 97 % | HEART RATE: 63 BPM

## 2025-08-18 DIAGNOSIS — E11.9 TYPE 2 DIABETES MELLITUS WITHOUT COMPLICATION, WITHOUT LONG-TERM CURRENT USE OF INSULIN: ICD-10-CM

## 2025-08-18 DIAGNOSIS — E11.9 TYPE 2 DIABETES MELLITUS WITHOUT COMPLICATION, WITHOUT LONG-TERM CURRENT USE OF INSULIN: Primary | ICD-10-CM

## 2025-08-18 DIAGNOSIS — I10 HYPERTENSION, UNSPECIFIED TYPE: ICD-10-CM

## 2025-08-18 DIAGNOSIS — E78.00 HYPERCHOLESTEREMIA: ICD-10-CM

## 2025-08-18 PROCEDURE — 1036F TOBACCO NON-USER: CPT

## 2025-08-18 PROCEDURE — 4010F ACE/ARB THERAPY RXD/TAKEN: CPT

## 2025-08-18 PROCEDURE — 3074F SYST BP LT 130 MM HG: CPT

## 2025-08-18 PROCEDURE — 99214 OFFICE O/P EST MOD 30 MIN: CPT

## 2025-08-18 PROCEDURE — RXMED WILLOW AMBULATORY MEDICATION CHARGE

## 2025-08-18 PROCEDURE — 3078F DIAST BP <80 MM HG: CPT

## 2025-08-18 RX ORDER — LISINOPRIL 20 MG/1
20 TABLET ORAL DAILY
Qty: 90 TABLET | Refills: 0 | OUTPATIENT
Start: 2025-08-18

## 2025-08-18 RX ORDER — LISINOPRIL 20 MG/1
20 TABLET ORAL DAILY
Qty: 90 TABLET | Refills: 0 | Status: SHIPPED | OUTPATIENT
Start: 2025-08-18

## 2025-08-18 RX ORDER — DEXTROSE 4 G
TABLET,CHEWABLE ORAL
Qty: 1 EACH | Refills: 0 | Status: SHIPPED | OUTPATIENT
Start: 2025-08-18

## 2025-08-18 RX ORDER — BLOOD SUGAR DIAGNOSTIC
1 STRIP MISCELLANEOUS DAILY
Qty: 100 STRIP | Refills: 0 | Status: SHIPPED | OUTPATIENT
Start: 2025-08-18

## 2025-08-18 RX ORDER — GLIPIZIDE 5 MG/1
5 TABLET ORAL
Qty: 90 TABLET | Refills: 0
Start: 2025-08-18 | End: 2025-11-16

## 2025-08-18 ASSESSMENT — PATIENT HEALTH QUESTIONNAIRE - PHQ9
2. FEELING DOWN, DEPRESSED OR HOPELESS: NOT AT ALL
1. LITTLE INTEREST OR PLEASURE IN DOING THINGS: NOT AT ALL
SUM OF ALL RESPONSES TO PHQ9 QUESTIONS 1 AND 2: 0

## 2025-08-18 ASSESSMENT — PAIN SCALES - GENERAL: PAINLEVEL_OUTOF10: 0-NO PAIN

## 2025-08-21 ENCOUNTER — PHARMACY VISIT (OUTPATIENT)
Dept: PHARMACY | Facility: CLINIC | Age: 60
End: 2025-08-21
Payer: COMMERCIAL

## 2025-08-21 DIAGNOSIS — I10 HYPERTENSION, UNSPECIFIED TYPE: ICD-10-CM

## 2025-08-21 DIAGNOSIS — E11.9 TYPE 2 DIABETES MELLITUS WITHOUT COMPLICATION, WITHOUT LONG-TERM CURRENT USE OF INSULIN: ICD-10-CM

## 2025-08-21 RX ORDER — GLIPIZIDE 5 MG/1
5 TABLET ORAL
Qty: 90 TABLET | Refills: 0 | Status: CANCELLED | OUTPATIENT
Start: 2025-08-21 | End: 2025-11-19

## 2025-08-22 ENCOUNTER — PHARMACY VISIT (OUTPATIENT)
Dept: PHARMACY | Facility: CLINIC | Age: 60
End: 2025-08-22
Payer: COMMERCIAL

## 2025-08-22 DIAGNOSIS — E11.9 TYPE 2 DIABETES MELLITUS WITHOUT COMPLICATION, WITHOUT LONG-TERM CURRENT USE OF INSULIN: ICD-10-CM

## 2025-08-22 PROCEDURE — RXMED WILLOW AMBULATORY MEDICATION CHARGE

## 2025-08-22 RX ORDER — LANCETS
EACH MISCELLANEOUS
Qty: 100 EACH | Refills: 0 | OUTPATIENT
Start: 2025-08-22

## 2025-08-22 RX ORDER — ATENOLOL 50 MG/1
50 TABLET ORAL 2 TIMES DAILY
Qty: 180 TABLET | Refills: 0 | Status: SHIPPED | OUTPATIENT
Start: 2025-08-22

## 2025-08-22 RX ORDER — GLIPIZIDE 5 MG/1
5 TABLET ORAL
Qty: 90 TABLET | Refills: 0 | OUTPATIENT
Start: 2025-08-22 | End: 2025-11-20

## 2025-11-11 ENCOUNTER — APPOINTMENT (OUTPATIENT)
Dept: OPHTHALMOLOGY | Facility: CLINIC | Age: 60
End: 2025-11-11
Payer: COMMERCIAL

## 2025-11-20 ENCOUNTER — APPOINTMENT (OUTPATIENT)
Dept: PRIMARY CARE | Facility: CLINIC | Age: 60
End: 2025-11-20
Payer: COMMERCIAL